# Patient Record
Sex: FEMALE | Race: WHITE | NOT HISPANIC OR LATINO | Employment: FULL TIME | URBAN - METROPOLITAN AREA
[De-identification: names, ages, dates, MRNs, and addresses within clinical notes are randomized per-mention and may not be internally consistent; named-entity substitution may affect disease eponyms.]

---

## 2023-03-15 DIAGNOSIS — Z00.00 HEALTHCARE MAINTENANCE: ICD-10-CM

## 2023-03-15 DIAGNOSIS — F32.A DEPRESSIVE DISORDER: ICD-10-CM

## 2023-03-15 RX ORDER — FLUOXETINE HYDROCHLORIDE 20 MG/1
20 CAPSULE ORAL DAILY
COMMUNITY
End: 2023-03-15 | Stop reason: SDUPTHER

## 2023-03-15 RX ORDER — CETIRIZINE HYDROCHLORIDE 10 MG/1
10 TABLET ORAL DAILY
COMMUNITY
End: 2023-03-15 | Stop reason: SDUPTHER

## 2023-03-15 RX ORDER — FLUOXETINE HYDROCHLORIDE 20 MG/1
20 CAPSULE ORAL DAILY
Qty: 90 CAPSULE | Refills: 3 | Status: SHIPPED | OUTPATIENT
Start: 2023-03-15 | End: 2023-04-13

## 2023-03-15 RX ORDER — CETIRIZINE HYDROCHLORIDE 10 MG/1
10 TABLET ORAL DAILY
Qty: 90 TABLET | Refills: 3 | Status: SHIPPED | OUTPATIENT
Start: 2023-03-15 | End: 2023-12-20

## 2023-03-15 RX ORDER — BUPROPION HYDROCHLORIDE 300 MG/1
300 TABLET ORAL DAILY
Qty: 90 TABLET | Refills: 3 | Status: SHIPPED | OUTPATIENT
Start: 2023-03-15 | End: 2023-04-13

## 2023-03-15 RX ORDER — BUPROPION HYDROCHLORIDE 300 MG/1
300 TABLET ORAL DAILY
COMMUNITY
End: 2023-03-15 | Stop reason: SDUPTHER

## 2023-04-11 DIAGNOSIS — F32.A DEPRESSIVE DISORDER: ICD-10-CM

## 2023-04-13 RX ORDER — BUPROPION HYDROCHLORIDE 300 MG/1
TABLET ORAL
Qty: 90 TABLET | Refills: 1 | Status: SHIPPED | OUTPATIENT
Start: 2023-04-13 | End: 2023-11-02 | Stop reason: SINTOL

## 2023-04-13 RX ORDER — FLUOXETINE HYDROCHLORIDE 20 MG/1
CAPSULE ORAL
Qty: 90 CAPSULE | Refills: 1 | Status: SHIPPED | OUTPATIENT
Start: 2023-04-13 | End: 2024-04-03

## 2023-09-11 ENCOUNTER — OFFICE VISIT (OUTPATIENT)
Dept: PRIMARY CARE | Facility: CLINIC | Age: 48
End: 2023-09-11
Payer: OTHER GOVERNMENT

## 2023-09-11 VITALS
OXYGEN SATURATION: 96 % | HEART RATE: 92 BPM | SYSTOLIC BLOOD PRESSURE: 132 MMHG | TEMPERATURE: 97.6 F | WEIGHT: 192 LBS | BODY MASS INDEX: 32.45 KG/M2 | RESPIRATION RATE: 18 BRPM | DIASTOLIC BLOOD PRESSURE: 84 MMHG

## 2023-09-11 DIAGNOSIS — B02.9 HERPES ZOSTER WITHOUT COMPLICATION: Primary | ICD-10-CM

## 2023-09-11 PROCEDURE — 99213 OFFICE O/P EST LOW 20 MIN: CPT | Performed by: NURSE PRACTITIONER

## 2023-09-11 PROCEDURE — 1036F TOBACCO NON-USER: CPT | Performed by: NURSE PRACTITIONER

## 2023-09-11 RX ORDER — VALACYCLOVIR HYDROCHLORIDE 1 G/1
1000 TABLET, FILM COATED ORAL 3 TIMES DAILY
Qty: 21 TABLET | Refills: 0 | Status: SHIPPED | OUTPATIENT
Start: 2023-09-11 | End: 2023-09-18

## 2023-09-11 ASSESSMENT — ENCOUNTER SYMPTOMS
COUGH: 0
CONSTITUTIONAL NEGATIVE: 1
SHORTNESS OF BREATH: 0
CARDIOVASCULAR NEGATIVE: 1
GASTROINTESTINAL NEGATIVE: 1
WHEEZING: 0

## 2023-09-11 NOTE — PROGRESS NOTES
Subjective   Patient ID: Selma Delacruz is a 48 y.o. female who presents for Rash.    Patient says that last week she was feeling some itchiness on her left buttock. She says that there was no rash at the time. Patient says that she had some tingling in the area this past Friday. She then noticed a rash on buttocks and going down the left thigh. It is itchy and tingling now. Patient notes that she has some lymph nodes that are swollen in her left groin.     Review of Systems   Constitutional: Negative.    HENT: Negative.     Respiratory:  Negative for cough, shortness of breath and wheezing.    Cardiovascular: Negative.    Gastrointestinal: Negative.    Skin:  Positive for rash.       Objective   /84   Pulse 92   Temp 36.4 °C (97.6 °F) (Temporal)   Resp 18   Wt 87.1 kg (192 lb)   SpO2 96%   BMI 32.45 kg/m²     Physical Exam  Vitals reviewed.   Constitutional:       General: She is not in acute distress.     Appearance: Normal appearance. She is not ill-appearing or diaphoretic.   Cardiovascular:      Rate and Rhythm: Normal rate and regular rhythm.      Heart sounds: Normal heart sounds. No murmur heard.  Pulmonary:      Effort: Pulmonary effort is normal.      Breath sounds: Normal breath sounds. No wheezing or rhonchi.   Musculoskeletal:         General: Normal range of motion.   Skin:     Findings: Rash (red blister like lesions on an erythematous base on the inner cheek of the buttocks, the lateral aspect of the left thigh and the inner left thig consistent with shingles) present.      Comments: Shotty lymphadenopathy of the groin lymph nodes on the left    Neurological:      General: No focal deficit present.      Mental Status: She is alert.   Psychiatric:         Mood and Affect: Mood normal.         Behavior: Behavior normal.     Assessment/Plan   Problem List Items Addressed This Visit    None  Visit Diagnoses       Herpes zoster without complication    -  Primary    Relevant Medications     valACYclovir (Valtrex) 1 gram tablet        Patient started on valtrex at this time. Advised pt that she may take tylenol or motrin every four to six hours as needed for any discomfort. Advised that patient is contagious and should avoid contact with anyone who is immunocompromised; such as expecting mothers, infants and the elderly. Advised that she is to keep the area clean and dry. Advised that it can take up to two weeks for the lesions to crust over and heal. Advised follow up if pain persists or with any new/concerning symptoms; she agreed. ER for any worsening rash or new/concerning symptoms; she agreed.

## 2023-11-01 PROBLEM — H52.223 REGULAR ASTIGMATISM, BILATERAL: Status: ACTIVE | Noted: 2017-09-19

## 2023-11-01 PROBLEM — M25.569 KNEE PAIN: Status: ACTIVE | Noted: 2023-11-01

## 2023-11-01 PROBLEM — F41.9 ANXIETY: Status: ACTIVE | Noted: 2023-11-01

## 2023-11-01 PROBLEM — Z86.19 HISTORY OF COLD SORES: Status: ACTIVE | Noted: 2023-11-01

## 2023-11-01 PROBLEM — H52.13 MYOPIA, BILATERAL: Status: ACTIVE | Noted: 2017-09-19

## 2023-11-01 PROBLEM — M70.51 PATELLAR BURSITIS OF RIGHT KNEE: Status: ACTIVE | Noted: 2023-11-01

## 2023-11-01 PROBLEM — H25.13 AGE-RELATED NUCLEAR CATARACT, BILATERAL: Status: ACTIVE | Noted: 2017-09-19

## 2023-11-01 PROBLEM — B00.9 HERPESVIRAL INFECTION, UNSPECIFIED: Status: ACTIVE | Noted: 2018-06-15

## 2023-11-01 PROBLEM — F43.10 POSTTRAUMATIC STRESS DISORDER: Status: ACTIVE | Noted: 2023-11-01

## 2023-11-01 RX ORDER — MULTIVITAMIN
1 TABLET ORAL DAILY
COMMUNITY
Start: 2022-05-05

## 2023-11-01 RX ORDER — ALPRAZOLAM 0.5 MG/1
0.5 TABLET ORAL EVERY 8 HOURS PRN
COMMUNITY

## 2023-11-01 RX ORDER — VALACYCLOVIR HYDROCHLORIDE 1 G/1
TABLET, FILM COATED ORAL
COMMUNITY
Start: 2022-05-06

## 2023-11-02 ENCOUNTER — LAB (OUTPATIENT)
Dept: LAB | Facility: LAB | Age: 48
End: 2023-11-02
Payer: OTHER GOVERNMENT

## 2023-11-02 ENCOUNTER — OFFICE VISIT (OUTPATIENT)
Dept: PRIMARY CARE | Facility: CLINIC | Age: 48
End: 2023-11-02
Payer: OTHER GOVERNMENT

## 2023-11-02 VITALS
RESPIRATION RATE: 16 BRPM | WEIGHT: 191 LBS | HEIGHT: 65 IN | HEART RATE: 72 BPM | TEMPERATURE: 98 F | DIASTOLIC BLOOD PRESSURE: 78 MMHG | SYSTOLIC BLOOD PRESSURE: 120 MMHG | OXYGEN SATURATION: 100 % | BODY MASS INDEX: 31.82 KG/M2

## 2023-11-02 DIAGNOSIS — Z12.31 ENCOUNTER FOR SCREENING MAMMOGRAM FOR MALIGNANT NEOPLASM OF BREAST: ICD-10-CM

## 2023-11-02 DIAGNOSIS — Z00.00 HEALTH CARE MAINTENANCE: ICD-10-CM

## 2023-11-02 DIAGNOSIS — Z98.82 HX OF BREAST IMPLANTS, BILATERAL: Primary | ICD-10-CM

## 2023-11-02 PROBLEM — M25.569 KNEE PAIN: Status: RESOLVED | Noted: 2023-11-01 | Resolved: 2023-11-02

## 2023-11-02 PROBLEM — M70.51 PATELLAR BURSITIS OF RIGHT KNEE: Status: RESOLVED | Noted: 2023-11-01 | Resolved: 2023-11-02

## 2023-11-02 PROBLEM — B00.9 HERPESVIRAL INFECTION, UNSPECIFIED: Status: RESOLVED | Noted: 2018-06-15 | Resolved: 2023-11-02

## 2023-11-02 LAB
25(OH)D3 SERPL-MCNC: 55 NG/ML (ref 30–100)
ALBUMIN SERPL BCP-MCNC: 4.3 G/DL (ref 3.4–5)
ALP SERPL-CCNC: 61 U/L (ref 33–110)
ALT SERPL W P-5'-P-CCNC: 19 U/L (ref 7–45)
ANION GAP SERPL CALC-SCNC: 12 MMOL/L (ref 10–20)
AST SERPL W P-5'-P-CCNC: 19 U/L (ref 9–39)
BILIRUB SERPL-MCNC: 0.5 MG/DL (ref 0–1.2)
BUN SERPL-MCNC: 15 MG/DL (ref 6–23)
CALCIUM SERPL-MCNC: 9.5 MG/DL (ref 8.6–10.3)
CHLORIDE SERPL-SCNC: 102 MMOL/L (ref 98–107)
CHOLEST SERPL-MCNC: 255 MG/DL (ref 0–199)
CHOLESTEROL/HDL RATIO: 5.3
CO2 SERPL-SCNC: 28 MMOL/L (ref 21–32)
CREAT SERPL-MCNC: 0.74 MG/DL (ref 0.5–1.05)
ERYTHROCYTE [DISTWIDTH] IN BLOOD BY AUTOMATED COUNT: 12.3 % (ref 11.5–14.5)
GFR SERPL CREATININE-BSD FRML MDRD: >90 ML/MIN/1.73M*2
GLUCOSE SERPL-MCNC: 84 MG/DL (ref 74–99)
HCT VFR BLD AUTO: 38 % (ref 36–46)
HDLC SERPL-MCNC: 48.4 MG/DL
HGB BLD-MCNC: 12.5 G/DL (ref 12–16)
LDLC SERPL CALC-MCNC: 173 MG/DL
MCH RBC QN AUTO: 31.5 PG (ref 26–34)
MCHC RBC AUTO-ENTMCNC: 32.9 G/DL (ref 32–36)
MCV RBC AUTO: 96 FL (ref 80–100)
NON HDL CHOLESTEROL: 207 MG/DL (ref 0–149)
NRBC BLD-RTO: 0 /100 WBCS (ref 0–0)
PLATELET # BLD AUTO: 282 X10*3/UL (ref 150–450)
POTASSIUM SERPL-SCNC: 4.6 MMOL/L (ref 3.5–5.3)
PROT SERPL-MCNC: 6.9 G/DL (ref 6.4–8.2)
RBC # BLD AUTO: 3.97 X10*6/UL (ref 4–5.2)
SODIUM SERPL-SCNC: 137 MMOL/L (ref 136–145)
TRIGL SERPL-MCNC: 168 MG/DL (ref 0–149)
TSH SERPL-ACNC: 1.57 MIU/L (ref 0.44–3.98)
VIT B12 SERPL-MCNC: 451 PG/ML (ref 211–911)
VLDL: 34 MG/DL (ref 0–40)
WBC # BLD AUTO: 4.7 X10*3/UL (ref 4.4–11.3)

## 2023-11-02 PROCEDURE — 80061 LIPID PANEL: CPT

## 2023-11-02 PROCEDURE — 80053 COMPREHEN METABOLIC PANEL: CPT

## 2023-11-02 PROCEDURE — 99396 PREV VISIT EST AGE 40-64: CPT | Performed by: INTERNAL MEDICINE

## 2023-11-02 PROCEDURE — 82306 VITAMIN D 25 HYDROXY: CPT

## 2023-11-02 PROCEDURE — 82607 VITAMIN B-12: CPT

## 2023-11-02 PROCEDURE — 84443 ASSAY THYROID STIM HORMONE: CPT

## 2023-11-02 PROCEDURE — 36415 COLL VENOUS BLD VENIPUNCTURE: CPT

## 2023-11-02 PROCEDURE — 1036F TOBACCO NON-USER: CPT | Performed by: INTERNAL MEDICINE

## 2023-11-02 PROCEDURE — 85027 COMPLETE CBC AUTOMATED: CPT

## 2023-11-02 RX ORDER — BUPROPION HYDROCHLORIDE 150 MG/1
150 TABLET ORAL EVERY MORNING
Qty: 30 TABLET | Refills: 1 | Status: SHIPPED | OUTPATIENT
Start: 2023-11-02 | End: 2023-12-20 | Stop reason: ALTCHOICE

## 2023-11-02 ASSESSMENT — PROMIS GLOBAL HEALTH SCALE
EMOTIONAL_PROBLEMS: SOMETIMES
RATE_AVERAGE_PAIN: 1
RATE_MENTAL_HEALTH: VERY GOOD
RATE_PHYSICAL_HEALTH: VERY GOOD
RATE_SOCIAL_SATISFACTION: EXCELLENT
RATE_QUALITY_OF_LIFE: EXCELLENT
CARRYOUT_SOCIAL_ACTIVITIES: EXCELLENT
RATE_GENERAL_HEALTH: VERY GOOD
RATE_AVERAGE_FATIGUE: MILD
CARRYOUT_PHYSICAL_ACTIVITIES: COMPLETELY

## 2023-11-02 ASSESSMENT — ENCOUNTER SYMPTOMS
PSYCHIATRIC NEGATIVE: 1
WHEEZING: 0
SORE THROAT: 0
NAUSEA: 0
FEVER: 0
EYE REDNESS: 0
HEADACHES: 0
COUGH: 0
FATIGUE: 0
BACK PAIN: 0
UNEXPECTED WEIGHT CHANGE: 0
FREQUENCY: 0
CONSTIPATION: 0
PALPITATIONS: 0
DIARRHEA: 0
ARTHRALGIAS: 0
WEAKNESS: 0
DYSURIA: 0
SHORTNESS OF BREATH: 0
EYE ITCHING: 0
RHINORRHEA: 0
EYE PAIN: 0
DIFFICULTY URINATING: 0
ABDOMINAL PAIN: 0

## 2023-11-02 ASSESSMENT — PATIENT HEALTH QUESTIONNAIRE - PHQ9
2. FEELING DOWN, DEPRESSED OR HOPELESS: NOT AT ALL
1. LITTLE INTEREST OR PLEASURE IN DOING THINGS: NOT AT ALL
SUM OF ALL RESPONSES TO PHQ9 QUESTIONS 1 AND 2: 0

## 2023-11-02 ASSESSMENT — PAIN SCALES - GENERAL: PAINLEVEL: 0-NO PAIN

## 2023-11-02 NOTE — PROGRESS NOTES
"Subjective   Selma Delacruz is a 48 y.o. female who presents for Annual Exam.    HPI   Influenza declines  Covid 2021  Shingrix  Tdap 2019  Mammogram 2022 implants long ago  Pap Hysterectomy  Colonoscopy approx 2020, 2019 pt obtaining report at [facility]  Bmi 31  Depression screen 23  Eye exam 23    Wants to stop Wellbutrin.    Review of Systems   Constitutional:  Negative for fatigue, fever and unexpected weight change.   HENT:  Negative for congestion, ear pain, rhinorrhea and sore throat.    Eyes:  Negative for pain, redness and itching.   Respiratory:  Negative for cough, shortness of breath and wheezing.    Cardiovascular:  Negative for chest pain, palpitations and leg swelling.   Gastrointestinal:  Negative for abdominal pain, constipation, diarrhea and nausea.   Genitourinary:  Negative for difficulty urinating, dysuria and frequency.   Musculoskeletal:  Negative for arthralgias and back pain.   Allergic/Immunologic: Negative for environmental allergies, food allergies and immunocompromised state.   Neurological:  Negative for weakness and headaches.   Psychiatric/Behavioral: Negative.     All other systems reviewed and are negative.      Health Maintenance Due   Topic Date Due    Yearly Adult Physical  Never done    Hepatitis B Vaccines (1 of 3 - 3-dose series) Never done    HIV Screening  Never done    Colorectal Cancer Screening  Never done    MMR Vaccines (1 of 1 - Standard series) Never done    Hepatitis C Screening  Never done    DTaP/Tdap/Td Vaccines (1 - Tdap) 05/04/2019    COVID-19 Vaccine (3 - Pfizer series) 06/30/2021    Zoster Vaccines (1 of 2) Never done    Mammogram  05/31/2023    Influenza Vaccine (1) Never done       Objective   /78   Pulse 72   Temp 36.7 °C (98 °F)   Resp 16   Ht 1.651 m (5' 5\")   Wt 86.6 kg (191 lb)   SpO2 100%   BMI 31.78 kg/m²     Physical Exam  Vitals and nursing note reviewed.   Constitutional:       Appearance: Normal appearance.   HENT:      Head: " Normocephalic.   Eyes:      Conjunctiva/sclera: Conjunctivae normal.      Pupils: Pupils are equal, round, and reactive to light.   Cardiovascular:      Rate and Rhythm: Normal rate and regular rhythm.      Pulses: Normal pulses.      Heart sounds: Normal heart sounds.   Pulmonary:      Effort: Pulmonary effort is normal.      Breath sounds: Normal breath sounds.   Musculoskeletal:         General: No swelling.      Cervical back: Neck supple.   Skin:     General: Skin is warm and dry.   Neurological:      General: No focal deficit present.      Mental Status: She is oriented to person, place, and time.         Assessment/Plan   Problem List Items Addressed This Visit    None  Visit Diagnoses       Hx of breast implants, bilateral    -  Primary    Relevant Medications    buPROPion XL (Wellbutrin XL) 150 mg 24 hr tablet    Other Relevant Orders    BI mammo bilateral diagnostic tomosynthesis    BI US breast complete bilateral    Referral to Plastic Surgery    Health care maintenance        Relevant Orders    CBC    Comprehensive Metabolic Panel    Lipid Panel    Thyroid Stimulating Hormone    Vitamin B12    Vitamin D 25-Hydroxy,Total (for eval of Vitamin D levels)    Encounter for screening mammogram for malignant neoplasm of breast

## 2023-11-03 ENCOUNTER — TELEPHONE (OUTPATIENT)
Dept: PRIMARY CARE | Facility: CLINIC | Age: 48
End: 2023-11-03
Payer: OTHER GOVERNMENT

## 2023-11-03 NOTE — TELEPHONE ENCOUNTER
----- Message from Lisa Hedrick DO sent at 11/3/2023  8:55 AM EDT -----  Call patient her labs look very good  Her chol is high, I would recommend lifesytle changes  Her risk is low recommend yearly recheck

## 2023-11-03 NOTE — RESULT ENCOUNTER NOTE
Call patient her labs look very good  Her chol is high, I would recommend lifesytle changes  Her risk is low recommend yearly recheck

## 2023-11-20 ENCOUNTER — HOSPITAL ENCOUNTER (OUTPATIENT)
Dept: RADIOLOGY | Facility: HOSPITAL | Age: 48
Discharge: HOME | End: 2023-11-20
Payer: OTHER GOVERNMENT

## 2023-11-20 ENCOUNTER — TELEPHONE (OUTPATIENT)
Dept: PRIMARY CARE | Facility: CLINIC | Age: 48
End: 2023-11-20
Payer: OTHER GOVERNMENT

## 2023-11-20 ENCOUNTER — APPOINTMENT (OUTPATIENT)
Dept: RADIOLOGY | Facility: HOSPITAL | Age: 48
End: 2023-11-20
Payer: OTHER GOVERNMENT

## 2023-11-20 DIAGNOSIS — Z98.82 HX OF BREAST IMPLANTS, BILATERAL: ICD-10-CM

## 2023-11-20 PROCEDURE — 77066 DX MAMMO INCL CAD BI: CPT | Mod: LEFT SIDE | Performed by: RADIOLOGY

## 2023-11-20 PROCEDURE — 77062 BREAST TOMOSYNTHESIS BI: CPT | Mod: LEFT SIDE | Performed by: RADIOLOGY

## 2023-11-20 PROCEDURE — 76642 ULTRASOUND BREAST LIMITED: CPT | Mod: LT

## 2023-11-20 PROCEDURE — 77066 DX MAMMO INCL CAD BI: CPT

## 2023-11-20 PROCEDURE — 76642 ULTRASOUND BREAST LIMITED: CPT | Mod: LEFT SIDE | Performed by: RADIOLOGY

## 2023-11-20 NOTE — TELEPHONE ENCOUNTER
----- Message from Lisa Hedrick DO sent at 11/20/2023 12:49 PM EST -----  Call patient mammogram is normal.

## 2023-12-13 ENCOUNTER — APPOINTMENT (OUTPATIENT)
Dept: PRIMARY CARE | Facility: CLINIC | Age: 48
End: 2023-12-13
Payer: OTHER GOVERNMENT

## 2023-12-20 ENCOUNTER — OFFICE VISIT (OUTPATIENT)
Dept: PRIMARY CARE | Facility: CLINIC | Age: 48
End: 2023-12-20
Payer: OTHER GOVERNMENT

## 2023-12-20 VITALS
TEMPERATURE: 98 F | DIASTOLIC BLOOD PRESSURE: 72 MMHG | HEIGHT: 65 IN | SYSTOLIC BLOOD PRESSURE: 110 MMHG | WEIGHT: 200 LBS | RESPIRATION RATE: 16 BRPM | BODY MASS INDEX: 33.32 KG/M2 | OXYGEN SATURATION: 98 % | HEART RATE: 76 BPM

## 2023-12-20 DIAGNOSIS — Z00.00 HEALTHCARE MAINTENANCE: ICD-10-CM

## 2023-12-20 DIAGNOSIS — E78.2 MIXED HYPERLIPIDEMIA: ICD-10-CM

## 2023-12-20 DIAGNOSIS — E66.9 OBESITY (BMI 30.0-34.9): Primary | ICD-10-CM

## 2023-12-20 PROCEDURE — 1036F TOBACCO NON-USER: CPT | Performed by: INTERNAL MEDICINE

## 2023-12-20 PROCEDURE — 99213 OFFICE O/P EST LOW 20 MIN: CPT | Performed by: INTERNAL MEDICINE

## 2023-12-20 RX ORDER — CETIRIZINE HYDROCHLORIDE 10 MG/1
10 TABLET ORAL DAILY
Qty: 90 TABLET | Refills: 3 | Status: SHIPPED | OUTPATIENT
Start: 2023-12-20 | End: 2024-04-26 | Stop reason: SDUPTHER

## 2023-12-20 RX ORDER — PHENTERMINE HYDROCHLORIDE 37.5 MG/1
37.5 TABLET ORAL
Qty: 30 TABLET | Refills: 0 | Status: SHIPPED | OUTPATIENT
Start: 2023-12-20 | End: 2024-01-22 | Stop reason: SDUPTHER

## 2023-12-20 ASSESSMENT — ENCOUNTER SYMPTOMS
FEVER: 0
SHORTNESS OF BREATH: 0
FATIGUE: 0
COUGH: 0

## 2023-12-20 ASSESSMENT — PAIN SCALES - GENERAL: PAINLEVEL: 0-NO PAIN

## 2023-12-20 NOTE — PROGRESS NOTES
OARRS:  Lisa Hedrick, DO on 12/20/2023  8:48 AM  I have personally reviewed the OARRS report for Selma Delacruz. I have considered the risks of abuse, dependence, addiction and diversion    Is the patient prescribed a combination of a benzodiazepine and opioid?  Yes, I feel it is clincially indicated to continue the medication and have discussed with the patient risks/benefits/alternatives.    Last Urine Drug Screen / ordered today: No  No results found for this or any previous visit (from the past 8760 hour(s)).  N/A    Clinical rationale for not completing a Urine Drug Screen: Patient prescribed only an antidiarrheal, anorexiant, or testosterone      Controlled Substance Agreement:  Date of the Last Agreement: 12/20/23  Reviewed Controlled Substance Agreement including but not limited to the benefits, risks, and alternatives to treatment with a Controlled Substance medication(s).    Anorexiants:   What is the patient's goal of therapy? Weight loss  Is this being achieved with current treatment? no    I have assessed the patient's continuing efforts to lose weight.    Patient has demonstrated continued efforts to lose weight, is dedicated to the treatment program and the response to treatment.    Activities of Daily Living:   Is your overall impression that this patient is benefiting (symptom reduction outweighs side effects) from anorexiants therapy? Yes     1. Physical Functioning: Better  2. Family Relationship: Better  3. Social Relationship: Better  4. Mood: Better  5. Sleep Patterns: Better  6. Overall Function: Better  Subjective   Selma Delacruz is a 48 y.o. female who presents for Weight Concerns.    HPI   Pt in office to discuss starting Adipex.  Having difficulty losing weight.  Has worked on diet and exercise.    Doing pilates      Review of Systems   Constitutional:  Negative for fatigue and fever.   Respiratory:  Negative for cough and shortness of breath.    Cardiovascular:  Negative for chest  "pain and leg swelling.   All other systems reviewed and are negative.      Health Maintenance Due   Topic Date Due    Hepatitis B Vaccines (1 of 3 - 3-dose series) Never done    HIV Screening  Never done    Colorectal Cancer Screening  Never done    COVID-19 Vaccine (1) Never done    MMR Vaccines (1 of 1 - Standard series) Never done    Hepatitis C Screening  Never done    DTaP/Tdap/Td Vaccines (1 - Tdap) 05/04/2019    Zoster Vaccines (1 of 2) Never done    Influenza Vaccine (1) Never done       Objective   /72   Pulse 76   Temp 36.7 °C (98 °F)   Resp 16   Ht 1.651 m (5' 5\")   Wt 90.7 kg (200 lb)   SpO2 98%   BMI 33.28 kg/m²     Physical Exam  Vitals and nursing note reviewed.   Constitutional:       Appearance: Normal appearance.   HENT:      Head: Normocephalic.   Eyes:      Conjunctiva/sclera: Conjunctivae normal.      Pupils: Pupils are equal, round, and reactive to light.   Cardiovascular:      Rate and Rhythm: Normal rate and regular rhythm.      Pulses: Normal pulses.      Heart sounds: Normal heart sounds.   Pulmonary:      Effort: Pulmonary effort is normal.      Breath sounds: Normal breath sounds.   Musculoskeletal:         General: No swelling.      Cervical back: Neck supple.   Skin:     General: Skin is warm and dry.   Neurological:      General: No focal deficit present.      Mental Status: She is oriented to person, place, and time.         Assessment/Plan   Problem List Items Addressed This Visit    None  Visit Diagnoses       Obesity (BMI 30.0-34.9)    -  Primary    Relevant Medications    phentermine (Adipex-P) 37.5 mg tablet    Mixed hyperlipidemia                Discussed risks and benefits of meds  Check glp1   I have personally reviewed patients OARRS report.  This report is scanned into the emr.  I have considered the risks of abuse, dependence, addiction and diversion.       "

## 2023-12-26 ENCOUNTER — APPOINTMENT (OUTPATIENT)
Dept: PRIMARY CARE | Facility: CLINIC | Age: 48
End: 2023-12-26
Payer: OTHER GOVERNMENT

## 2024-01-22 ENCOUNTER — OFFICE VISIT (OUTPATIENT)
Dept: PRIMARY CARE | Facility: CLINIC | Age: 49
End: 2024-01-22
Payer: OTHER GOVERNMENT

## 2024-01-22 VITALS
WEIGHT: 193 LBS | SYSTOLIC BLOOD PRESSURE: 118 MMHG | HEIGHT: 65 IN | BODY MASS INDEX: 32.15 KG/M2 | DIASTOLIC BLOOD PRESSURE: 80 MMHG | TEMPERATURE: 97.9 F | OXYGEN SATURATION: 100 % | HEART RATE: 88 BPM | RESPIRATION RATE: 16 BRPM

## 2024-01-22 DIAGNOSIS — E66.9 OBESITY (BMI 30.0-34.9): ICD-10-CM

## 2024-01-22 DIAGNOSIS — Z12.11 SCREENING FOR COLON CANCER: Primary | ICD-10-CM

## 2024-01-22 PROCEDURE — 1036F TOBACCO NON-USER: CPT | Performed by: INTERNAL MEDICINE

## 2024-01-22 PROCEDURE — 99213 OFFICE O/P EST LOW 20 MIN: CPT | Performed by: INTERNAL MEDICINE

## 2024-01-22 RX ORDER — PHENTERMINE HYDROCHLORIDE 37.5 MG/1
37.5 TABLET ORAL
Qty: 30 TABLET | Refills: 0 | Status: SHIPPED | OUTPATIENT
Start: 2024-01-22 | End: 2024-02-21 | Stop reason: SDUPTHER

## 2024-01-22 ASSESSMENT — PAIN SCALES - GENERAL: PAINLEVEL: 0-NO PAIN

## 2024-01-22 ASSESSMENT — ENCOUNTER SYMPTOMS
COUGH: 0
FEVER: 0
SHORTNESS OF BREATH: 0
FATIGUE: 0

## 2024-01-22 NOTE — PROGRESS NOTES
"Subjective   Selma Delacruz is a 48 y.o. female who presents for Weight Check.    HPI   Started Adipex.  Denies adverse s/e's.  Tolerating well.  Eating less.  1 meal/day.    Review of Systems   Constitutional:  Negative for fatigue and fever.   Respiratory:  Negative for cough and shortness of breath.    Cardiovascular:  Negative for chest pain and leg swelling.   All other systems reviewed and are negative.      Health Maintenance Due   Topic Date Due    Hepatitis B Vaccines (1 of 3 - 3-dose series) Never done    HIV Screening  Never done    MMR Vaccines (1 of 1 - Standard series) Never done    Hepatitis C Screening  Never done    Diabetes Screening  Never done    DTaP/Tdap/Td Vaccines (1 - Tdap) 05/04/2019    COVID-19 Vaccine (3 - Pfizer series) 06/30/2021    Zoster Vaccines (1 of 2) Never done    Influenza Vaccine (1) Never done       Objective   /80   Pulse 88   Temp 36.6 °C (97.9 °F)   Resp 16   Ht 1.651 m (5' 5\")   Wt 87.5 kg (193 lb)   SpO2 100%   BMI 32.12 kg/m²     Physical Exam  Vitals and nursing note reviewed.   Constitutional:       Appearance: Normal appearance.   HENT:      Head: Normocephalic.   Eyes:      Conjunctiva/sclera: Conjunctivae normal.      Pupils: Pupils are equal, round, and reactive to light.   Cardiovascular:      Rate and Rhythm: Normal rate and regular rhythm.      Pulses: Normal pulses.      Heart sounds: Normal heart sounds.   Pulmonary:      Effort: Pulmonary effort is normal.      Breath sounds: Normal breath sounds.   Musculoskeletal:         General: No swelling.      Cervical back: Neck supple.   Skin:     General: Skin is warm and dry.   Neurological:      General: No focal deficit present.      Mental Status: She is oriented to person, place, and time.         Assessment/Plan   Problem List Items Addressed This Visit    None  Visit Diagnoses       Screening for colon cancer    -  Primary    Relevant Orders    Colonoscopy Screening; Average Risk Patient    " Obesity (BMI 30.0-34.9)        Relevant Medications    phentermine (Adipex-P) 37.5 mg tablet          I have personally reviewed patients OARRS report.  This report is scanned into the emr.  I have considered the risks of abuse, dependence, addiction and diversion.  Cont adipex  Pt doing well but safer option going forward longterm is wegovy and zepbound   Will check with in[employer]urance

## 2024-01-24 DIAGNOSIS — F41.9 ANXIETY: Primary | ICD-10-CM

## 2024-01-24 RX ORDER — BUPROPION HYDROCHLORIDE 300 MG/1
300 TABLET ORAL EVERY MORNING
Qty: 30 TABLET | Refills: 3 | Status: SHIPPED | OUTPATIENT
Start: 2024-01-24 | End: 2024-02-01

## 2024-01-24 RX ORDER — BUPROPION HYDROCHLORIDE 150 MG/1
150 TABLET ORAL EVERY MORNING
Qty: 30 TABLET | Refills: 0 | Status: SHIPPED | OUTPATIENT
Start: 2024-01-24 | End: 2024-02-21 | Stop reason: ALTCHOICE

## 2024-02-01 DIAGNOSIS — F41.9 ANXIETY: ICD-10-CM

## 2024-02-01 RX ORDER — BUPROPION HYDROCHLORIDE 300 MG/1
300 TABLET ORAL DAILY
Qty: 90 TABLET | Refills: 3 | Status: SHIPPED | OUTPATIENT
Start: 2024-02-01 | End: 2024-04-26 | Stop reason: SDUPTHER

## 2024-02-03 ENCOUNTER — ANESTHESIA EVENT (OUTPATIENT)
Dept: GASTROENTEROLOGY | Facility: EXTERNAL LOCATION | Age: 49
End: 2024-02-03

## 2024-02-06 ENCOUNTER — HOSPITAL ENCOUNTER (OUTPATIENT)
Dept: RADIOLOGY | Facility: CLINIC | Age: 49
Discharge: HOME | End: 2024-02-06
Payer: OTHER GOVERNMENT

## 2024-02-06 DIAGNOSIS — M25.562 PAIN IN LEFT KNEE: ICD-10-CM

## 2024-02-06 PROCEDURE — 73564 X-RAY EXAM KNEE 4 OR MORE: CPT | Mod: LEFT SIDE | Performed by: RADIOLOGY

## 2024-02-06 PROCEDURE — 73564 X-RAY EXAM KNEE 4 OR MORE: CPT | Mod: LT

## 2024-02-09 ENCOUNTER — OFFICE VISIT (OUTPATIENT)
Dept: ORTHOPEDIC SURGERY | Facility: CLINIC | Age: 49
End: 2024-02-09
Payer: OTHER GOVERNMENT

## 2024-02-09 VITALS — HEIGHT: 65 IN | WEIGHT: 195 LBS | BODY MASS INDEX: 32.49 KG/M2

## 2024-02-09 DIAGNOSIS — S83.242A TEAR OF MEDIAL MENISCUS OF LEFT KNEE, CURRENT, UNSPECIFIED TEAR TYPE, INITIAL ENCOUNTER: Primary | ICD-10-CM

## 2024-02-09 PROCEDURE — 2500000004 HC RX 250 GENERAL PHARMACY W/ HCPCS (ALT 636 FOR OP/ED): Performed by: STUDENT IN AN ORGANIZED HEALTH CARE EDUCATION/TRAINING PROGRAM

## 2024-02-09 PROCEDURE — 2500000005 HC RX 250 GENERAL PHARMACY W/O HCPCS: Performed by: STUDENT IN AN ORGANIZED HEALTH CARE EDUCATION/TRAINING PROGRAM

## 2024-02-09 PROCEDURE — 20610 DRAIN/INJ JOINT/BURSA W/O US: CPT | Mod: LT | Performed by: STUDENT IN AN ORGANIZED HEALTH CARE EDUCATION/TRAINING PROGRAM

## 2024-02-09 PROCEDURE — 1036F TOBACCO NON-USER: CPT | Performed by: STUDENT IN AN ORGANIZED HEALTH CARE EDUCATION/TRAINING PROGRAM

## 2024-02-09 PROCEDURE — 99214 OFFICE O/P EST MOD 30 MIN: CPT | Performed by: STUDENT IN AN ORGANIZED HEALTH CARE EDUCATION/TRAINING PROGRAM

## 2024-02-09 PROCEDURE — 99204 OFFICE O/P NEW MOD 45 MIN: CPT | Performed by: STUDENT IN AN ORGANIZED HEALTH CARE EDUCATION/TRAINING PROGRAM

## 2024-02-09 RX ORDER — LIDOCAINE HYDROCHLORIDE 10 MG/ML
4 INJECTION INFILTRATION; PERINEURAL
Status: COMPLETED | OUTPATIENT
Start: 2024-02-09 | End: 2024-02-09

## 2024-02-09 RX ORDER — TRIAMCINOLONE ACETONIDE 40 MG/ML
40 INJECTION, SUSPENSION INTRA-ARTICULAR; INTRAMUSCULAR
Status: COMPLETED | OUTPATIENT
Start: 2024-02-09 | End: 2024-02-09

## 2024-02-09 RX ADMIN — TRIAMCINOLONE ACETONIDE 40 MG: 40 INJECTION, SUSPENSION INTRA-ARTICULAR; INTRAMUSCULAR at 09:29

## 2024-02-09 RX ADMIN — LIDOCAINE HYDROCHLORIDE 4 ML: 10 INJECTION, SOLUTION INFILTRATION; PERINEURAL at 09:29

## 2024-02-09 ASSESSMENT — PAIN SCALES - GENERAL: PAINLEVEL_OUTOF10: 4

## 2024-02-09 ASSESSMENT — PAIN - FUNCTIONAL ASSESSMENT: PAIN_FUNCTIONAL_ASSESSMENT: 0-10

## 2024-02-09 NOTE — PROGRESS NOTES
Chief Complaint   Patient presents with    Left Knee - New Patient Visit     Left knee pain   DOI- on going pain x 1 month   Xray's done alycia 24       HPI  48-year-old female with left knee pain that has been present for at least the last month.  Very active with Pilates and has been having some clicking catching popping as well as instability even when she was walking into her visit today to clinic.  States that this is quite bothersome did recently see local urgent care who did refer her to orthopedics.    Past Medical History:   Diagnosis Date    Anxiety 2000    Personal history of other endocrine, nutritional and metabolic disease     History of high cholesterol       Past Surgical History:   Procedure Laterality Date    BREAST SURGERY       SECTION, LOW TRANSVERSE      COSMETIC SURGERY      HYSTERECTOMY      OTHER SURGICAL HISTORY  2022    Bunionectomy    OTHER SURGICAL HISTORY  2022    Breast augmentation    TUBAL LIGATION          No Known Allergies     Physical exam    General: Alert and oriented to place, person, and time.  No acute distress and breathing comfortably; pleasant and cooperative with the examination.  HEENT: Head is normocephalic and atraumatic.  Neck: Supple, no visible swelling.  Cardiovascular: Good perfusion to the affected extremity.  Lungs: No audible wheezing or labored breathing.  Abdomen: Nondistended  HEME/Lymph : No visible abnormalities bilateral lower extremity    Extremity:  Left knee:  Skin healthy and intact  No gross swelling or ecchymosis  No significant varus or valgus malalignment  Effusion: Mild     ROM:  Full flexion   Full extension  No pain with internal rotation of the hip  Tenderness to palpation: Medial lateral joint line     No laxity to valgus stress  No laxity to varus stress  Negative Lachman´s test  Negative anterior drawer test  Negative posterior drawer test  Positive Liliam´s test     Neurovascular exam normal  distally    Diagnostics:  XR knee left 4+ views    Result Date: 2/6/2024  Interpreted By:  Chance Thomas, STUDY: XR KNEE LEFT 4+ VIEWS; ;  2/6/2024 9:22 am   INDICATION: Signs/Symptoms:Pain in left knee.   COMPARISON: None.   ACCESSION NUMBER(S): [id number]   ORDERING CLINICIAN: MIK PARRA   FINDINGS: Four views demonstrate normal alignment without joint space narrowing, fracture or suspicious osseous lesion. Findings are equivocal for effusion.       No acute osseous abnormality or significant degenerative joint space narrowing.   Findings are equivocal for effusion.     MACRO: None   Signed by: Chance Thomas 2/6/2024 10:49 AM Dictation workstation:   [id number]       Procedure:  L Inj/Asp: L knee on 2/9/2024 9:29 AM  Indications: pain  Details: 22 G needle, anterolateral approach  Medications: 40 mg triamcinolone acetonide 40 mg/mL; 4 mL lidocaine 10 mg/mL (1 %)  Consent was given by the patient. Immediately prior to procedure a time out was called to verify the correct patient, procedure, equipment, support staff and site/side marked as required. Patient was prepped and draped in the usual sterile fashion.           Assessment:  48-year-old female with concerns for internal derangement of the knee    Treatment plan:  The natural history of the condition and its associated treatment alternatives including surgical and nonsurgical options were discussed with the patient at length.  As patient does have upcoming trip to [place] he is hoping to get some temporary relief does wish to proceed with an intra-articular injection  The history and clinical exam are consistent with intraarticular pathology and therefore MRI is medically indicated to evaluate the soft tissues of the joint. Follow up in one week or after completion of the MRI to advance management accordingly  The patient understands and agrees with the plan.  I discussed risks and benefits of corticosteroid injection and the patient would like to  proceed.  Discussed risks of skin depigmentation and the rare but possible intravascular injection of local anesthetic which can cause palpitations or arrhythmias. I discussed the possibility of a transient increase in blood sugar. I explained that the local anesthetic tends to work immediately, it may take 2-3 days for the full effect of the corticosteroid compound. Consent was obtained and side confirmed by the patient.    Will have patient follow-up in 1 to 2 weeks for further discussion of treatment continue use of over-the-counter compression sleeve.  Discussed activities to avoid as well as prevention of injury.  All of the patient's questions were answered.

## 2024-02-14 ENCOUNTER — HOSPITAL ENCOUNTER (OUTPATIENT)
Dept: RADIOLOGY | Facility: HOSPITAL | Age: 49
Discharge: HOME | End: 2024-02-14
Payer: OTHER GOVERNMENT

## 2024-02-14 DIAGNOSIS — S83.242A TEAR OF MEDIAL MENISCUS OF LEFT KNEE, CURRENT, UNSPECIFIED TEAR TYPE, INITIAL ENCOUNTER: ICD-10-CM

## 2024-02-14 PROCEDURE — 73721 MRI JNT OF LWR EXTRE W/O DYE: CPT | Mod: LT

## 2024-02-14 PROCEDURE — 73721 MRI JNT OF LWR EXTRE W/O DYE: CPT | Mod: LEFT SIDE | Performed by: RADIOLOGY

## 2024-02-15 ENCOUNTER — HOSPITAL ENCOUNTER (OUTPATIENT)
Dept: GASTROENTEROLOGY | Facility: EXTERNAL LOCATION | Age: 49
Discharge: HOME | End: 2024-02-15
Payer: OTHER GOVERNMENT

## 2024-02-15 ENCOUNTER — ANESTHESIA (OUTPATIENT)
Dept: GASTROENTEROLOGY | Facility: EXTERNAL LOCATION | Age: 49
End: 2024-02-15

## 2024-02-15 VITALS
HEIGHT: 65 IN | HEART RATE: 64 BPM | SYSTOLIC BLOOD PRESSURE: 116 MMHG | WEIGHT: 180 LBS | BODY MASS INDEX: 29.99 KG/M2 | DIASTOLIC BLOOD PRESSURE: 67 MMHG | RESPIRATION RATE: 16 BRPM | OXYGEN SATURATION: 97 % | TEMPERATURE: 97.3 F

## 2024-02-15 DIAGNOSIS — Z12.11 SCREENING FOR COLON CANCER: Primary | ICD-10-CM

## 2024-02-15 PROCEDURE — 88305 TISSUE EXAM BY PATHOLOGIST: CPT | Performed by: PATHOLOGY

## 2024-02-15 PROCEDURE — 45381 COLONOSCOPY SUBMUCOUS NJX: CPT | Performed by: INTERNAL MEDICINE

## 2024-02-15 PROCEDURE — 45385 COLONOSCOPY W/LESION REMOVAL: CPT | Performed by: INTERNAL MEDICINE

## 2024-02-15 PROCEDURE — 88305 TISSUE EXAM BY PATHOLOGIST: CPT

## 2024-02-15 PROCEDURE — 0753T DGTZ GLS MCRSCP SLD LEVEL IV: CPT

## 2024-02-15 RX ORDER — SODIUM CHLORIDE 9 MG/ML
20 INJECTION, SOLUTION INTRAVENOUS CONTINUOUS
Status: DISCONTINUED | OUTPATIENT
Start: 2024-02-15 | End: 2024-02-16 | Stop reason: HOSPADM

## 2024-02-15 RX ORDER — PROPOFOL 10 MG/ML
INJECTION, EMULSION INTRAVENOUS AS NEEDED
Status: DISCONTINUED | OUTPATIENT
Start: 2024-02-15 | End: 2024-02-15

## 2024-02-15 RX ORDER — LIDOCAINE HYDROCHLORIDE 20 MG/ML
INJECTION, SOLUTION INFILTRATION; PERINEURAL AS NEEDED
Status: DISCONTINUED | OUTPATIENT
Start: 2024-02-15 | End: 2024-02-15

## 2024-02-15 RX ADMIN — PROPOFOL 150 MG: 10 INJECTION, EMULSION INTRAVENOUS at 08:06

## 2024-02-15 RX ADMIN — LIDOCAINE HYDROCHLORIDE 50 MG: 20 INJECTION, SOLUTION INFILTRATION; PERINEURAL at 08:06

## 2024-02-15 RX ADMIN — PROPOFOL 50 MG: 10 INJECTION, EMULSION INTRAVENOUS at 08:23

## 2024-02-15 RX ADMIN — PROPOFOL 50 MG: 10 INJECTION, EMULSION INTRAVENOUS at 08:19

## 2024-02-15 RX ADMIN — PROPOFOL 50 MG: 10 INJECTION, EMULSION INTRAVENOUS at 08:14

## 2024-02-15 RX ADMIN — SODIUM CHLORIDE: 9 INJECTION, SOLUTION INTRAVENOUS at 08:28

## 2024-02-15 SDOH — HEALTH STABILITY: MENTAL HEALTH: CURRENT SMOKER: 0

## 2024-02-15 ASSESSMENT — COLUMBIA-SUICIDE SEVERITY RATING SCALE - C-SSRS
6. HAVE YOU EVER DONE ANYTHING, STARTED TO DO ANYTHING, OR PREPARED TO DO ANYTHING TO END YOUR LIFE?: NO
2. HAVE YOU ACTUALLY HAD ANY THOUGHTS OF KILLING YOURSELF?: NO
1. IN THE PAST MONTH, HAVE YOU WISHED YOU WERE DEAD OR WISHED YOU COULD GO TO SLEEP AND NOT WAKE UP?: NO

## 2024-02-15 ASSESSMENT — PAIN - FUNCTIONAL ASSESSMENT
PAIN_FUNCTIONAL_ASSESSMENT: 0-10

## 2024-02-15 ASSESSMENT — PAIN SCALES - GENERAL
PAINLEVEL_OUTOF10: 0 - NO PAIN
PAIN_LEVEL: 0

## 2024-02-15 NOTE — ANESTHESIA POSTPROCEDURE EVALUATION
Patient: Selma Delacruz    Procedure Summary       Date: 02/15/24 Room / Location: [facility]    Anesthesia Start: 0803 Anesthesia Stop:     Procedure: COLONOSCOPY Diagnosis:       Screening for colon cancer      Screening for colon cancer    Scheduled Providers: Srinivas Miguel MD Responsible Provider: ISABELLE Novoa    Anesthesia Type: MAC ASA Status: 2            Anesthesia Type: MAC    Vitals Value Taken Time   /72 02/15/24 0830   Temp 36 02/15/24 0830   Pulse 91 02/15/24 0830   Resp 12 02/15/24 0830   SpO2 97 02/15/24 0830       Anesthesia Post Evaluation    Patient location during evaluation: bedside  Patient participation: complete - patient participated  Level of consciousness: awake  Pain score: 0  Pain management: adequate  Airway patency: patent  Cardiovascular status: acceptable  Respiratory status: acceptable and room air  Hydration status: acceptable  Postoperative Nausea and Vomiting: none        No notable events documented.

## 2024-02-15 NOTE — PRE-SEDATION DOCUMENTATION
Patient: Selma Delacruz  MRN: [id number]    Pre-sedation Evaluation:  Sedation necessary for: Analgesia  Requesting service: gi    History of Present Illness: screening colonoscopy     Past Medical History:   Diagnosis Date   • Anxiety 2000   • Personal history of other endocrine, nutritional and metabolic disease     History of high cholesterol       Principle problems:  Patient Active Problem List    Diagnosis Date Noted   • Anxiety 11/01/2023   • History of cold sores 11/01/2023   • Posttraumatic stress disorder 11/01/2023   • Age-related nuclear cataract, bilateral 09/19/2017   • Myopia, bilateral 09/19/2017   • Regular astigmatism, bilateral 09/19/2017     Allergies:  No Known Allergies  PTA/Current Medications:  (Not in a hospital admission)    Current Outpatient Medications   Medication Sig Dispense Refill   • ALPRAZolam (Xanax) 0.5 mg tablet Take 1 tablet (0.5 mg) by mouth every 8 hours if needed.     • buPROPion XL (Wellbutrin XL) 300 mg 24 hr tablet Take 1 tablet (300 mg) by mouth once daily. 90 tablet 3   • cetirizine (ZyrTEC) 10 mg tablet Take 1 tablet (10 mg) by mouth once daily. 90 tablet 3   • FLUoxetine (PROzac) 20 mg capsule TAKE 1 CAPSULE BY MOUTH EVERY DAY 90 capsule 1   • multivitamin tablet Take 1 tablet by mouth once daily.     • NON FORMULARY Elderberry + Vit D + Vit C tabs- Take 2 tabs by mouth daily.     • phentermine (Adipex-P) 37.5 mg tablet Take 1 tablet (37.5 mg) by mouth once daily in the morning. Take before meals. 30 tablet 0   • valACYclovir (Valtrex) 1 gram tablet Take 2 tablets by mouth twice daily as needed for cold sores.     • BIOTIN ORAL Biotin Capsule- Take 1 cap by mouth daily.  (Pt unsure of dosage)     • buPROPion XL (Wellbutrin XL) 150 mg 24 hr tablet Take 1 tablet (150 mg) by mouth once daily in the morning. Do not crush, chew, or split. 30 tablet 0     No current facility-administered medications for this encounter.     Past Surgical History:   has a past surgical history  that includes Other surgical history (2022); Other surgical history (2022); Breast surgery; Cosmetic surgery;  section, low transverse; Hysterectomy; and Tubal ligation.    Recent sedation/surgery (24 hours) No    Review of Systems:  Please check all that apply: No significant medical history    Pregnancy test completed prior to procedure on any menstruating female: none        NPO guidelines met: Yes    Physical Exam    Airway  Mallampati: I  TM distance: <3 FB  Neck ROM: full     Cardiovascular - normal exam     Dental - normal exam     Pulmonary - normal exam         Plan    ASA 1     Deep

## 2024-02-15 NOTE — ANESTHESIA PREPROCEDURE EVALUATION
Patient: Selma Delacruz    Procedure Information       Date/Time: 02/15/24 0800    Scheduled providers: Srinivas Miguel MD    Procedure: COLONOSCOPY    Location: [facility]            Relevant Problems   Neuro/Psych   (+) Anxiety   (+) Posttraumatic stress disorder       Clinical information reviewed:   Tobacco  Allergies  Meds  Problems  Med Hx  Surg Hx  OB Status           NPO Detail:  No data recorded     Physical Exam    Airway  Mallampati: II  TM distance: >3 FB  Neck ROM: full     Cardiovascular - normal exam     Dental    Pulmonary   Breath sounds clear to auscultation     Abdominal   Abdomen: soft  Bowel sounds: normal     Other findings: Preoxygenated 2L prior to procedure          Anesthesia Plan    History of general anesthesia?: yes  History of complications of general anesthesia?: no    ASA 2     MAC     The patient is not a current smoker.    intravenous induction   Anesthetic plan and risks discussed with patient.    Plan discussed with CRNA.

## 2024-02-15 NOTE — Clinical Note
Abd soft Vital signs stable. Arousable prior to transport. Patient transported to PACU via stretcher. Handoff completed.

## 2024-02-16 ENCOUNTER — OFFICE VISIT (OUTPATIENT)
Dept: ORTHOPEDIC SURGERY | Facility: CLINIC | Age: 49
End: 2024-02-16
Payer: OTHER GOVERNMENT

## 2024-02-16 VITALS — BODY MASS INDEX: 29.99 KG/M2 | HEIGHT: 65 IN | WEIGHT: 180 LBS

## 2024-02-16 DIAGNOSIS — M17.12 PATELLOFEMORAL ARTHRITIS OF LEFT KNEE: Primary | ICD-10-CM

## 2024-02-16 PROCEDURE — 99214 OFFICE O/P EST MOD 30 MIN: CPT | Performed by: STUDENT IN AN ORGANIZED HEALTH CARE EDUCATION/TRAINING PROGRAM

## 2024-02-16 PROCEDURE — 1036F TOBACCO NON-USER: CPT | Performed by: STUDENT IN AN ORGANIZED HEALTH CARE EDUCATION/TRAINING PROGRAM

## 2024-02-16 RX ORDER — LIRAGLUTIDE 6 MG/ML
INJECTION, SOLUTION SUBCUTANEOUS
COMMUNITY
Start: 2023-01-18 | End: 2024-02-21

## 2024-02-16 NOTE — PROGRESS NOTES
Chief Complaint   Patient presents with    Left Knee - Follow-up     MRI F/U       HPI  Patient presents today for follow up of left knee MRI results.  Patient is status post intra-articular injection will finish her trip coming up.  0 out of 10 today.  Cortisone injection provided her significant.  Date of injection 2/9/2024      Physical exam  General: Alert and oriented to place, person, and time.  No acute distress and breathing comfortably; pleasant and cooperative with the examination.  Extremity:  Focused examination left knee: Overlying skin is clean dry intact some crepitus with joint able exam mild tenderness to palpation peripatellar.  No appreciable effusion.  Neurovascular intact compartments are soft and compressible.    Diagnostics:  MR knee left wo IV contrast    Result Date: 2/14/2024  Interpreted By:  Antione Cerna, STUDY: MR KNEE LEFT WO IV CONTRAST;  2/14/2024 10:46 am   INDICATION: Signs/Symptoms:pain.   COMPARISON: None.   ACCESSION NUMBER(S): [id number]   ORDERING CLINICIAN: RORY ELI   TECHNIQUE: Multiplanar and multisequential MR images of the left knee are performed.   FINDINGS: There is a small joint effusion.   There is moderate thinning of the patellar articular cartilage more pronounced at the lower pole and patellar apex. Subcortical degenerative bone marrow signal is identified at the lateral facet and lower pole as well as the patellar apex towards the upper pole. There is mild thinning of the articular cartilage at the medial and lateral compartments though no focal defect. There is no sign of acute osseous fracture, bone marrow edema, or osseous mass. There has no loose osteochondral lesion.   The lateral and medial menisci are of normal morphology. There is no linear tear truncation of the menisci.   The anterior and posterior cruciate ligaments, lateral collateral ligament complex, popliteus tendon, medial collateral ligament, extensor complex, and patellar  retinacula are intact.   The surrounding soft tissues are unremarkable.       Moderate to severe chondromalacia patella.   No convincing evidence of meniscal tear.   No sign of acute ligament disruption.   No acute osseous abnormality.   Small joint effusion.   Signed by: Antione Cerna 2/14/2024 10:54 AM Dictation workstation:   [id number]    XR knee left 4+ views    Result Date: 2/6/2024  Interpreted By:  Chance Thomas, STUDY: XR KNEE LEFT 4+ VIEWS; ;  2/6/2024 9:22 am   INDICATION: Signs/Symptoms:Pain in left knee.   COMPARISON: None.   ACCESSION NUMBER(S): [id number]   ORDERING CLINICIAN: MIK PARRA   FINDINGS: Four views demonstrate normal alignment without joint space narrowing, fracture or suspicious osseous lesion. Findings are equivocal for effusion.       No acute osseous abnormality or significant degenerative joint space narrowing.   Findings are equivocal for effusion.     MACRO: None   Signed by: Chance Thomas 2/6/2024 10:49 AM Dictation workstation:   [id number]     Procedures  Procedures     Assessment:  49-year-old female with patellofemoral chondromalacia/arthritis mild to moderate    Treatment plan:  Constellation of findings discussed with patient detail  Recommend working on hip and hip strengthening to help offload the patellofemoral space discussed activities to avoid  Follow-up as needed  Discussed that if she fails to improve with physician directed home exercises activity  Patient may benefit from another injection discussion patellofemoral chondroplasty  Will continue with conservative treatment  All of the patient's questions concerns answered    I spent 35 minutes in the visit which includes: Face-to-face and non-face-to-face time working for this specific patient.  All time was on the date of service.  Total time in activities performed include the following: Preparing to see the patient(e.g., review of test, previous progress notes of mine or another provider;  obtaining and/or reviewing separately obtained history; performing a medically appropriate examination and/or counseling and educating the patient/family/caregiver; ordering medications, tests or procedures; referring and communicating with other healthcare professionals (not separately reported); documenting clinical information in the electronic and/or health record; independently interpreting results (not separately reported) and communicating results to the patient family caregiver care coordination (not separately reported).

## 2024-02-21 ENCOUNTER — OFFICE VISIT (OUTPATIENT)
Dept: PRIMARY CARE | Facility: CLINIC | Age: 49
End: 2024-02-21
Payer: OTHER GOVERNMENT

## 2024-02-21 VITALS
OXYGEN SATURATION: 100 % | WEIGHT: 190 LBS | DIASTOLIC BLOOD PRESSURE: 72 MMHG | HEART RATE: 96 BPM | SYSTOLIC BLOOD PRESSURE: 120 MMHG | TEMPERATURE: 98 F | HEIGHT: 65 IN | RESPIRATION RATE: 16 BRPM | BODY MASS INDEX: 31.65 KG/M2

## 2024-02-21 DIAGNOSIS — E66.9 OBESITY (BMI 30.0-34.9): ICD-10-CM

## 2024-02-21 PROCEDURE — 1036F TOBACCO NON-USER: CPT | Performed by: INTERNAL MEDICINE

## 2024-02-21 PROCEDURE — 99213 OFFICE O/P EST LOW 20 MIN: CPT | Performed by: INTERNAL MEDICINE

## 2024-02-21 RX ORDER — PHENTERMINE HYDROCHLORIDE 37.5 MG/1
37.5 TABLET ORAL
Qty: 30 TABLET | Refills: 0 | Status: SHIPPED | OUTPATIENT
Start: 2024-02-21 | End: 2024-03-15 | Stop reason: ALTCHOICE

## 2024-02-21 ASSESSMENT — PATIENT HEALTH QUESTIONNAIRE - PHQ9
SUM OF ALL RESPONSES TO PHQ9 QUESTIONS 1 AND 2: 0
1. LITTLE INTEREST OR PLEASURE IN DOING THINGS: NOT AT ALL
2. FEELING DOWN, DEPRESSED OR HOPELESS: NOT AT ALL

## 2024-02-21 NOTE — PROGRESS NOTES
Subjective   Selma Delacruz is a 49 y.o. female who presents for Weight Check.    HPI   Tolerating Adipex well.  Denies adverse s/e's.  Working on diet.  Unable to exercise x1 month d/t knee pain.    Down 3 pounds this month  Down 10 pounds in 2 months     Tolerating Wellbutrin 300 mg well.  Sx's well controlled at this time.    OARRS:  Lisa Hedrick,  on 2/21/2024  8:34 AM  I have personally reviewed the OARRS report for Selma Delacruz. I have considered the risks of abuse, dependence, addiction and diversion    Is the patient prescribed a combination of a benzodiazepine and opioid?  Yes, I feel it is clincially indicated to continue the medication and have discussed with the patient risks/benefits/alternatives.    Last Urine Drug Screen / ordered today: Yes  No results found for this or any previous visit (from the past 8760 hour(s)).  Results are as expected.     Clinical rationale for not completing a Urine Drug Screen: done      Controlled Substance Agreement:  Date of the Last Agreement: 12/20/2023  Reviewed Controlled Substance Agreement including but not limited to the benefits, risks, and alternatives to treatment with a Controlled Substance medication(s).    Stimulants:   What is the patient's goal of therapy? Weight loss  Is this being achieved with current treatment? Yes    Activities of Daily Living:   Is your overall impression that this patient is benefiting (symptom reduction outweighs side effects) from stimulant therapy? Yes     1. Physical Functioning: Better  2. Family Relationship: Better  3. Social Relationship: Better  4. Mood: Better  5. Sleep Patterns: Better  6. Overall Function: Better    Review of Systems    Health Maintenance Due   Topic Date Due    Hepatitis B Vaccines (1 of 3 - 3-dose series) Never done    HIV Screening  Never done    MMR Vaccines (1 of 1 - Standard series) Never done    Hepatitis C Screening  Never done    Diabetes Screening  Never done    DTaP/Tdap/Td Vaccines  "(1 - Tdap) 05/04/2019    Zoster Vaccines (1 of 2) Never done    Influenza Vaccine (1) Never done    COVID-19 Vaccine (3 - 2023-24 season) 09/01/2023       Objective   /72   Pulse 96   Temp 36.7 °C (98 °F)   Resp 16   Ht 1.651 m (5' 5\")   Wt 86.2 kg (190 lb)   SpO2 100%   BMI 31.62 kg/m²     Physical Exam    Assessment/Plan   Problem List Items Addressed This Visit    None  Visit Diagnoses       Obesity (BMI 30.0-34.9)        Relevant Medications    phentermine (Adipex-P) 37.5 mg tablet    tirzepatide, weight loss, (Zepbound) 2.5 mg/0.5 mL injection          I have personally reviewed patients OARRS report.  This report is scanned into the emr.  I have considered the risks of abuse, dependence, addiction and diversion.  Fu in 2 months       "

## 2024-02-22 ENCOUNTER — APPOINTMENT (OUTPATIENT)
Dept: PRIMARY CARE | Facility: CLINIC | Age: 49
End: 2024-02-22
Payer: OTHER GOVERNMENT

## 2024-02-22 LAB
LABORATORY COMMENT REPORT: NORMAL
PATH REPORT.FINAL DX SPEC: NORMAL
PATH REPORT.GROSS SPEC: NORMAL
PATH REPORT.TOTAL CANCER: NORMAL

## 2024-02-26 ENCOUNTER — TELEPHONE (OUTPATIENT)
Dept: GASTROENTEROLOGY | Facility: EXTERNAL LOCATION | Age: 49
End: 2024-02-26
Payer: OTHER GOVERNMENT

## 2024-03-15 DIAGNOSIS — E66.9 OBESITY (BMI 30.0-34.9): Primary | ICD-10-CM

## 2024-03-15 RX ORDER — PHENTERMINE AND TOPIRAMATE 7.5; 46 MG/1; MG/1
1 CAPSULE, EXTENDED RELEASE ORAL DAILY
Qty: 30 CAPSULE | Refills: 2 | Status: SHIPPED | OUTPATIENT
Start: 2024-03-15 | End: 2024-06-13

## 2024-03-15 RX ORDER — PHENTERMINE AND TOPIRAMATE 3.75; 23 MG/1; MG/1
1 CAPSULE, EXTENDED RELEASE ORAL DAILY
Qty: 14 CAPSULE | Refills: 0 | Status: SHIPPED | OUTPATIENT
Start: 2024-03-15 | End: 2024-03-29

## 2024-04-02 DIAGNOSIS — F32.A DEPRESSIVE DISORDER: ICD-10-CM

## 2024-04-03 RX ORDER — FLUOXETINE HYDROCHLORIDE 20 MG/1
20 CAPSULE ORAL DAILY
Qty: 90 CAPSULE | Refills: 3 | Status: SHIPPED | OUTPATIENT
Start: 2024-04-03 | End: 2024-04-26 | Stop reason: SDUPTHER

## 2024-04-26 ENCOUNTER — TELEPHONE (OUTPATIENT)
Dept: PRIMARY CARE | Facility: CLINIC | Age: 49
End: 2024-04-26

## 2024-04-26 ENCOUNTER — APPOINTMENT (OUTPATIENT)
Dept: PRIMARY CARE | Facility: CLINIC | Age: 49
End: 2024-04-26
Payer: OTHER GOVERNMENT

## 2024-04-26 DIAGNOSIS — Z00.00 HEALTHCARE MAINTENANCE: ICD-10-CM

## 2024-04-26 DIAGNOSIS — F32.A DEPRESSIVE DISORDER: ICD-10-CM

## 2024-04-26 DIAGNOSIS — F41.9 ANXIETY: ICD-10-CM

## 2024-04-26 NOTE — TELEPHONE ENCOUNTER
Pt is out of town and needs prescriptions sent to the following pharmacy:    Name:  Selma Delacruz  :  [id number]  Medication Name:  FLUoxetine (PROzac) 20 mg capsule   Medication Name:  buPROPion XL (Wellbutrin XL) 300 mg 24 hr tablet   Medication Name:  cetirizine (ZyrTEC) 10 mg tablet   Specific Pharmacy location:  [address]  Date of last appointment:  24  Date of next appointment:  24  Best number to reach patient:  [phone]

## 2024-04-29 RX ORDER — BUPROPION HYDROCHLORIDE 300 MG/1
300 TABLET ORAL DAILY
Qty: 90 TABLET | Refills: 0 | Status: SHIPPED | OUTPATIENT
Start: 2024-04-29

## 2024-04-29 RX ORDER — CETIRIZINE HYDROCHLORIDE 10 MG/1
10 TABLET ORAL DAILY
Qty: 90 TABLET | Refills: 0 | Status: SHIPPED | OUTPATIENT
Start: 2024-04-29 | End: 2025-04-29

## 2024-04-29 RX ORDER — FLUOXETINE HYDROCHLORIDE 20 MG/1
20 CAPSULE ORAL DAILY
Qty: 90 CAPSULE | Refills: 0 | Status: SHIPPED | OUTPATIENT
Start: 2024-04-29 | End: 2025-04-29

## 2024-05-10 ENCOUNTER — APPOINTMENT (OUTPATIENT)
Dept: ORTHOPEDIC SURGERY | Facility: CLINIC | Age: 49
End: 2024-05-10
Payer: OTHER GOVERNMENT

## 2024-05-22 ENCOUNTER — OFFICE VISIT (OUTPATIENT)
Dept: ORTHOPEDIC SURGERY | Facility: CLINIC | Age: 49
End: 2024-05-22
Payer: OTHER GOVERNMENT

## 2024-05-22 DIAGNOSIS — M17.12 PATELLOFEMORAL ARTHRITIS OF LEFT KNEE: Primary | ICD-10-CM

## 2024-05-22 PROCEDURE — 2500000004 HC RX 250 GENERAL PHARMACY W/ HCPCS (ALT 636 FOR OP/ED): Performed by: STUDENT IN AN ORGANIZED HEALTH CARE EDUCATION/TRAINING PROGRAM

## 2024-05-22 PROCEDURE — 99214 OFFICE O/P EST MOD 30 MIN: CPT | Performed by: STUDENT IN AN ORGANIZED HEALTH CARE EDUCATION/TRAINING PROGRAM

## 2024-05-22 PROCEDURE — 20610 DRAIN/INJ JOINT/BURSA W/O US: CPT | Performed by: STUDENT IN AN ORGANIZED HEALTH CARE EDUCATION/TRAINING PROGRAM

## 2024-05-22 PROCEDURE — 2500000005 HC RX 250 GENERAL PHARMACY W/O HCPCS: Performed by: STUDENT IN AN ORGANIZED HEALTH CARE EDUCATION/TRAINING PROGRAM

## 2024-05-22 RX ORDER — LIDOCAINE HYDROCHLORIDE 10 MG/ML
4 INJECTION INFILTRATION; PERINEURAL
Status: COMPLETED | OUTPATIENT
Start: 2024-05-22 | End: 2024-05-22

## 2024-05-22 RX ORDER — TRIAMCINOLONE ACETONIDE 40 MG/ML
40 INJECTION, SUSPENSION INTRA-ARTICULAR; INTRAMUSCULAR
Status: COMPLETED | OUTPATIENT
Start: 2024-05-22 | End: 2024-05-22

## 2024-05-22 RX ADMIN — TRIAMCINOLONE ACETONIDE 40 MG: 40 INJECTION, SUSPENSION INTRA-ARTICULAR; INTRAMUSCULAR at 11:11

## 2024-05-22 RX ADMIN — LIDOCAINE HYDROCHLORIDE 4 ML: 10 INJECTION, SOLUTION INFILTRATION; PERINEURAL at 11:11

## 2024-05-22 NOTE — PROGRESS NOTES
Chief Complaint   Patient presents with    Left Knee - Pain     Patellofemoral Chondromalacia / Arthritis requests Inj today       HPI  49-year-old female with known history of patellofemoral chondromalacia presents today for repeat cortisone injection.  Prior injection did provide her 3 months relief.  Has been having some pain about her knee.  Some days better than others.  Endorses some grinding about her knee which is quite painful.      Physical exam  General: Alert and oriented to place, person, and time.  No acute distress and breathing comfortably; pleasant and cooperative with the examination.  Extremity:  Left knee:  Skin healthy and intact  No gross swelling or ecchymosis  Alignment: No  Effusion: Mild  ROM: Full  Crepitance with range of motion  No pain with internal rotation of the hip  Tenderness to palpation: Peripatellar     No laxity to valgus stress  No laxity to varus stress  Negative Lachman´s test  Negative posterior drawer test  Mild pain with Liliam´s test     Neurovascular exam normal distally  2+ DP pulse and good cap refill  Diagnostics:  MR knee left wo IV contrast    Result Date: 2/14/2024  Interpreted By:  Antione Cerna, STUDY: MR KNEE LEFT WO IV CONTRAST;  2/14/2024 10:46 am   INDICATION: Signs/Symptoms:pain.   COMPARISON: None.   ACCESSION NUMBER(S): [id number]   ORDERING CLINICIAN: RORY ELI   TECHNIQUE: Multiplanar and multisequential MR images of the left knee are performed.   FINDINGS: There is a small joint effusion.   There is moderate thinning of the patellar articular cartilage more pronounced at the lower pole and patellar apex. Subcortical degenerative bone marrow signal is identified at the lateral facet and lower pole as well as the patellar apex towards the upper pole. There is mild thinning of the articular cartilage at the medial and lateral compartments though no focal defect. There is no sign of acute osseous fracture, bone marrow edema, or osseous  Patient returned call again      Thank you   mass. There has no loose osteochondral lesion.   The lateral and medial menisci are of normal morphology. There is no linear tear truncation of the menisci.   The anterior and posterior cruciate ligaments, lateral collateral ligament complex, popliteus tendon, medial collateral ligament, extensor complex, and patellar retinacula are intact.   The surrounding soft tissues are unremarkable.       Moderate to severe chondromalacia patella.   No convincing evidence of meniscal tear.   No sign of acute ligament disruption.   No acute osseous abnormality.   Small joint effusion.   Signed by: Antione Cerna 2/14/2024 10:54 AM Dictation workstation:   [id number]    XR knee left 4+ views    Result Date: 2/6/2024  Interpreted By:  Chance Thomas, STUDY: XR KNEE LEFT 4+ VIEWS; ;  2/6/2024 9:22 am   INDICATION: Signs/Symptoms:Pain in left knee.   COMPARISON: None.   ACCESSION NUMBER(S): [id number]   ORDERING CLINICIAN: MIK PARRA   FINDINGS: Four views demonstrate normal alignment without joint space narrowing, fracture or suspicious osseous lesion. Findings are equivocal for effusion.       No acute osseous abnormality or significant degenerative joint space narrowing.   Findings are equivocal for effusion.     MACRO: None   Signed by: Chance Thomas 2/6/2024 10:49 AM Dictation workstation:   [id number]     Procedures  L Inj/Asp: L knee on 5/22/2024 11:11 AM  Indications: pain  Details: 22 G needle, anterolateral approach  Medications: 40 mg triamcinolone acetonide 40 mg/mL; 4 mL lidocaine 10 mg/mL (1 %)  Consent was given by the patient. Immediately prior to procedure a time out was called to verify the correct patient, procedure, equipment, support staff and site/side marked as required. Patient was prepped and draped in the usual sterile fashion.            Assessment:  49-year-old female with patellofemoral chondromalacia/arthritis mild to moderate    Treatment plan:  Will proceed with an intra-articular  injection today  Discussed with patient that I will have her follow-up in 3 months time for further discussion of potential use of diagnostic knee arthroscopy, patellofemoral chondroplasty.  Discussed with her that I do not want to proceed with continued repeat cortisone injections as this can damage cartilage inherently.  I discussed risks and benefits of corticosteroid injection and the patient would like to proceed.  Discussed risks of skin depigmentation and the rare but possible intravascular injection of local anesthetic which can cause palpitations or arrhythmias. I discussed the possibility of a transient increase in blood sugar. I explained that the local anesthetic tends to work immediately, it may take 2-3 days for the full effect of the corticosteroid compound. Consent was obtained and side confirmed by the patient.

## 2024-05-28 ENCOUNTER — APPOINTMENT (OUTPATIENT)
Dept: ORTHOPEDIC SURGERY | Facility: CLINIC | Age: 49
End: 2024-05-28
Payer: OTHER GOVERNMENT

## 2024-06-17 ENCOUNTER — APPOINTMENT (OUTPATIENT)
Dept: PRIMARY CARE | Facility: CLINIC | Age: 49
End: 2024-06-17
Payer: OTHER GOVERNMENT

## 2024-06-17 VITALS
HEIGHT: 65 IN | TEMPERATURE: 98 F | WEIGHT: 182 LBS | OXYGEN SATURATION: 100 % | HEART RATE: 88 BPM | SYSTOLIC BLOOD PRESSURE: 116 MMHG | DIASTOLIC BLOOD PRESSURE: 72 MMHG | RESPIRATION RATE: 16 BRPM | BODY MASS INDEX: 30.32 KG/M2

## 2024-06-17 DIAGNOSIS — F41.9 ANXIETY: ICD-10-CM

## 2024-06-17 DIAGNOSIS — E66.9 OBESITY (BMI 30.0-34.9): Primary | ICD-10-CM

## 2024-06-17 PROCEDURE — 1036F TOBACCO NON-USER: CPT | Performed by: INTERNAL MEDICINE

## 2024-06-17 PROCEDURE — 99214 OFFICE O/P EST MOD 30 MIN: CPT | Performed by: INTERNAL MEDICINE

## 2024-06-17 RX ORDER — ALPRAZOLAM 0.5 MG/1
0.5 TABLET ORAL EVERY 8 HOURS PRN
Qty: 15 TABLET | Refills: 0 | Status: SHIPPED | OUTPATIENT
Start: 2024-06-17 | End: 2024-06-22

## 2024-06-17 ASSESSMENT — ENCOUNTER SYMPTOMS
FEVER: 0
COUGH: 0
SHORTNESS OF BREATH: 0
FATIGUE: 0

## 2024-06-17 NOTE — PROGRESS NOTES
Subjective   Selma Delacruz is a 49 y.o. female who presents for Obesity follow up.    HPI   Pt started Qsymia.  Increased to 7.5 mg.  Tolerated well.  Had jitters, difficulty falling asleep w/Adipex.  Has been off med x1 month.    OARRS:  Lisa Hedrick,  on 6/17/2024  4:27 PM  I have personally reviewed the OARRS report for Selma eDlacruz. I have considered the risks of abuse, dependence, addiction and diversion    Is the patient prescribed a combination of a benzodiazepine and opioid?  Yes, I feel it is clincially indicated to continue the medication and have discussed with the patient risks/benefits/alternatives.    Last Urine Drug Screen / ordered today: Yes  No results found for this or any previous visit (from the past 8760 hour(s)).  Results are as expected.     Clinical rationale for not completing a Urine Drug Screen: today      Controlled Substance Agreement:  Date of the Last Agreement: today  Reviewed Controlled Substance Agreement including but not limited to the benefits, risks, and alternatives to treatment with a Controlled Substance medication(s).    Benzodiazepines:  What is the patient's goal of therapy? Anxiety relief  Is this being achieved with current treatment? Yes    SALVADOR-7:  No data recorded    Activities of Daily Living:   Is your overall impression that this patient is benefiting (symptom reduction outweighs side effects) from benzodiazepine therapy? Yes     1. Physical Functioning: Better  2. Family Relationship: Better  3. Social Relationship: Better  4. Mood: Better  5. Sleep Patterns: Better  6. Overall Function: Better  Review of Systems   Constitutional:  Negative for fatigue and fever.   Respiratory:  Negative for cough and shortness of breath.    Cardiovascular:  Negative for chest pain and leg swelling.   All other systems reviewed and are negative.      Health Maintenance Due   Topic Date Due    HIV Screening  Never done    MMR Vaccines (1 of 1 - Standard series) Never  "done    Hepatitis C Screening  Never done    Diabetes Screening  Never done    Hepatitis B Vaccines (1 of 3 - 19+ 3-dose series) Never done    DTaP/Tdap/Td Vaccines (1 - Tdap) 05/04/2019    Zoster Vaccines (1 of 2) Never done    COVID-19 Vaccine (3 - 2023-24 season) 09/01/2023       Objective   /72   Pulse 88   Temp 36.7 °C (98 °F)   Resp 16   Ht 1.651 m (5' 5\")   Wt 82.6 kg (182 lb)   SpO2 100%   BMI 30.29 kg/m²     Physical Exam  Vitals and nursing note reviewed.   Constitutional:       Appearance: Normal appearance.   HENT:      Head: Normocephalic.   Eyes:      Conjunctiva/sclera: Conjunctivae normal.      Pupils: Pupils are equal, round, and reactive to light.   Cardiovascular:      Rate and Rhythm: Normal rate and regular rhythm.      Pulses: Normal pulses.      Heart sounds: Normal heart sounds.   Pulmonary:      Effort: Pulmonary effort is normal.      Breath sounds: Normal breath sounds.   Musculoskeletal:         General: No swelling.      Cervical back: Neck supple.   Skin:     General: Skin is warm and dry.   Neurological:      General: No focal deficit present.      Mental Status: She is oriented to person, place, and time.         Assessment/Plan   Problem List Items Addressed This Visit       Anxiety    Relevant Medications    ALPRAZolam (Xanax) 0.5 mg tablet     Other Visit Diagnoses       Obesity (BMI 30.0-34.9)    -  Primary    Relevant Medications    tirzepatide, weight loss, (Zepbound) 2.5 mg/0.5 mL injection          I have personally reviewed patients OARRS report.  This report is scanned into the emr.  I have considered the risks of abuse, dependence, addiction and diversion.    Can't take contrave  Side effects from adipex and qsymia  Trial of zepbound        "

## 2024-07-12 DIAGNOSIS — E66.9 OBESITY (BMI 30.0-34.9): Primary | ICD-10-CM

## 2024-07-15 DIAGNOSIS — F41.9 ANXIETY: ICD-10-CM

## 2024-07-15 RX ORDER — BUPROPION HYDROCHLORIDE 300 MG/1
300 TABLET ORAL DAILY
Qty: 90 TABLET | Refills: 0 | Status: SHIPPED | OUTPATIENT
Start: 2024-07-15

## 2024-07-31 ENCOUNTER — OFFICE VISIT (OUTPATIENT)
Dept: ORTHOPEDIC SURGERY | Facility: CLINIC | Age: 49
End: 2024-07-31
Payer: OTHER GOVERNMENT

## 2024-07-31 DIAGNOSIS — M22.42 CHONDROMALACIA OF LEFT PATELLOFEMORAL JOINT: Primary | ICD-10-CM

## 2024-07-31 PROCEDURE — 1036F TOBACCO NON-USER: CPT | Performed by: STUDENT IN AN ORGANIZED HEALTH CARE EDUCATION/TRAINING PROGRAM

## 2024-07-31 PROCEDURE — 99214 OFFICE O/P EST MOD 30 MIN: CPT | Performed by: STUDENT IN AN ORGANIZED HEALTH CARE EDUCATION/TRAINING PROGRAM

## 2024-07-31 RX ORDER — OXYCODONE AND ACETAMINOPHEN 5; 325 MG/1; MG/1
1 TABLET ORAL EVERY 8 HOURS PRN
Qty: 20 TABLET | Refills: 0 | Status: SHIPPED | OUTPATIENT
Start: 2024-07-31 | End: 2024-08-07

## 2024-07-31 RX ORDER — HYDROCODONE BITARTRATE AND ACETAMINOPHEN 5; 325 MG/1; MG/1
1 TABLET ORAL EVERY 6 HOURS PRN
Qty: 21 TABLET | Refills: 0 | Status: SHIPPED | OUTPATIENT
Start: 2024-07-31 | End: 2024-08-07

## 2024-07-31 RX ORDER — NAPROXEN 500 MG/1
500 TABLET ORAL 2 TIMES DAILY
Qty: 60 TABLET | Refills: 0 | Status: SHIPPED | OUTPATIENT
Start: 2024-07-31 | End: 2024-08-30

## 2024-07-31 NOTE — PROGRESS NOTES
Chief Complaint   Patient presents with    Left Knee - Follow-up     Patellofemoral Chondromalacia / Arthritis s/p cortisone inj 05/22/2024       HPI  49-year-old female with known history of patellofemoral chondromalacia presents today for repeat evaluation of her left.  Patient has had persistent ongoing pain and mechanical symptoms.  She has attempted the following: Activity modifications, anti-inflammatories, cortisone injections.  Prior injections have provided her with up to 3 months relief.  Last visit 5/22/2024 she did receive a repeat cortisone injection which she states did not provide her with as much relief as she has in the past.  States she has still experienced discomfort throughout the day with increased pain and mechanical symptoms with activities.  Some days better than others.  Endorses some grinding about her knee which is quite painful.  Continues to deny any numbness or tingling to the distal lower extremity.  Presenting today for discussion of alternative treatment options.      Physical exam  General: Alert and oriented to place, person, and time.  No acute distress and breathing comfortably; pleasant and cooperative with the examination.  Extremity:  Left knee:  Skin healthy and intact  No gross swelling or ecchymosis  Alignment: No  Effusion: Mild  ROM: Full  Crepitance with range of motion  No pain with internal rotation of the hip  Tenderness to palpation: Peripatellar     No laxity to valgus stress  No laxity to varus stress  Negative Lachman´s test  Negative posterior drawer test  Mild pain with Liliam´s test     Neurovascular exam normal distally  2+ DP pulse and good cap refill  Diagnostics:  MR knee left wo IV contrast    Result Date: 2/14/2024  Interpreted By:  Antione Cerna, STUDY: MR KNEE LEFT WO IV CONTRAST;  2/14/2024 10:46 am   INDICATION: Signs/Symptoms:pain.   COMPARISON: None.   ACCESSION NUMBER(S): [id number]   ORDERING CLINICIAN: RORY ELI   TECHNIQUE:  Multiplanar and multisequential MR images of the left knee are performed.   FINDINGS: There is a small joint effusion.   There is moderate thinning of the patellar articular cartilage more pronounced at the lower pole and patellar apex. Subcortical degenerative bone marrow signal is identified at the lateral facet and lower pole as well as the patellar apex towards the upper pole. There is mild thinning of the articular cartilage at the medial and lateral compartments though no focal defect. There is no sign of acute osseous fracture, bone marrow edema, or osseous mass. There has no loose osteochondral lesion.   The lateral and medial menisci are of normal morphology. There is no linear tear truncation of the menisci.   The anterior and posterior cruciate ligaments, lateral collateral ligament complex, popliteus tendon, medial collateral ligament, extensor complex, and patellar retinacula are intact.   The surrounding soft tissues are unremarkable.       Moderate to severe chondromalacia patella.   No convincing evidence of meniscal tear.   No sign of acute ligament disruption.   No acute osseous abnormality.   Small joint effusion.   Signed by: Antione Cerna 2/14/2024 10:54 AM Dictation workstation:   [id number]    XR knee left 4+ views    Result Date: 2/6/2024  Interpreted By:  Chance Thomas, STUDY: XR KNEE LEFT 4+ VIEWS; ;  2/6/2024 9:22 am   INDICATION: Signs/Symptoms:Pain in left knee.   COMPARISON: None.   ACCESSION NUMBER(S): [id number]   ORDERING CLINICIAN: MIK PARRA   FINDINGS: Four views demonstrate normal alignment without joint space narrowing, fracture or suspicious osseous lesion. Findings are equivocal for effusion.       No acute osseous abnormality or significant degenerative joint space narrowing.   Findings are equivocal for effusion.     MACRO: None   Signed by: Chance Thomas 2/6/2024 10:49 AM Dictation workstation:   [id number]     Procedures  Procedures      Assessment:  49-year-old female with patellofemoral chondromalacia/arthritis mild to moderate    Treatment plan:  Treatment modalities discussed with patient at length today including anti-inflammatories, bracing, physical therapy, cortisone injections, gel injections, knee arthroscopy, ultimately total knee arthroplasty as well as risk benefits and alternatives to each  Reviewed with patient risk of continued repeat cortisone injections  Discussed with patient that she is still having persistent symptoms despite conservative treatment we may proceed with diagnostic knee arthroscopy, patellofemoral chondroplasty.  Discussed with patient risk benefits and limitations of this procedure.  Patient elects to proceed with diagnostic knee arthroscopy, patellofemoral chondroplasty  Please refer to Dr. Aguilar's portion of this note for further surgical planning    Sebastian Salcido PA-C    In a face to face encounter, I evaluated the patient and performed a physical examination, discussed pertinent diagnostic studies if indicated and discussed diagnosis and management strategies with both the patient and physician assistant / nurse practitioner.  I reviewed the PA/NP's note and agree with the documented findings and plan of care.     Constellation of findings discussed with Selma in detail today she is attempted activity modifications, physical therapy, gel injections with little to no relief.  Does wish to proceed with diagnostic knee arthroscopy, patellofemoral chondroplasty.  Discussed that this may not alleviate all of her symptoms as she does have some arthritis is demonstrated on MRI however she does wish to avoid total knee arthroplasty at this point in time.  Will obtain medical clearance prior to proceeding.  MRI reviewed with Qi in detail we will take a close look at the medial lateral meniscus to ensure there is also no mechanical component contributing with knee arthroscopy.    We discussed the options of  operative versus nonoperative management with the attendant risks and benefits and the patient is interested in surgical treatment. I have discussed the alternatives of continued nonoperative treatment with observation, physical therapy, and / or medication versus surgery with the patient and we have thoughtfully considered these options. The patient wishes to proceed with surgical treatment.     We will proceed with left knee diagnostic arthroscopy and patellofemoral chondroplasty    The planned surgical procedure includes examination under anesthesia. Anesthetic risks will be discussed with the patient by the anesthetist. Arthroscopic evaluation will be performed of the knee joint.  Then an arthroscopic procedure will be performed which may include debridement or repair of the the meniscus or cartilage, synovectomy, and removal of loose bodies.  In addition, if there are advanced degenerative changes I have advised the patient that this may indeed be a progressive process, ultimately resulting in further pain at some point in the future.    Risks of the procedure include but are not limited to infection, bleeding, persistent pain, compartment syndrome, neurologic injury, pressure sores or burns related to positioning or equipment, failure of repair if performed, weakness, arthorfibrosis or stiffness of the joint, limited function and/or limited use of the extremity. The rare complication of death was discussed with the patient. Also, the possible need for revision surgery was discussed.     All this was discussed with the patient and consent obtained. All questions were answered. The patient understands and will consider the surgical options with the above risks in mind. If repair is performed of the meniscus a brace may be provided as  part of a treatment plan which will include wearing the immobilizer at all times.    Patient was given Norco for postoperative pain control.  We will pick this up prior to surgery  so that they are not looking for during the day of surgery. I have personally reviewed the OARRS report for this patient. This report is scanned into  the electronic medical record. I have considered the risks of abuse, dependence, addiction, and diversion. They currently report a pain of 8.    Regarding DVT prophylaxis, recommend generalized ambulation          Chandler Aguilar III, MD

## 2024-08-02 PROBLEM — M22.42 CHONDROMALACIA PATELLAE, LEFT KNEE: Status: ACTIVE | Noted: 2024-09-13

## 2024-08-09 ENCOUNTER — TELEPHONE (OUTPATIENT)
Dept: PRIMARY CARE | Facility: CLINIC | Age: 49
End: 2024-08-09
Payer: OTHER GOVERNMENT

## 2024-08-09 NOTE — TELEPHONE ENCOUNTER
Pt called and said she needs a refill on this medication ASAP    Fluoxetine (Prozac) 20mg capsule    [facility] [address]     Please advise, thanks!

## 2024-08-12 DIAGNOSIS — F32.A DEPRESSIVE DISORDER: ICD-10-CM

## 2024-08-12 RX ORDER — FLUOXETINE HYDROCHLORIDE 20 MG/1
20 CAPSULE ORAL DAILY
Qty: 90 CAPSULE | Refills: 0 | Status: SHIPPED | OUTPATIENT
Start: 2024-08-12 | End: 2025-08-12

## 2024-08-19 ENCOUNTER — APPOINTMENT (OUTPATIENT)
Dept: PLASTIC SURGERY | Facility: CLINIC | Age: 49
End: 2024-08-19
Payer: OTHER GOVERNMENT

## 2024-08-19 ENCOUNTER — APPOINTMENT (OUTPATIENT)
Dept: ORTHOPEDIC SURGERY | Facility: CLINIC | Age: 49
End: 2024-08-19
Payer: OTHER GOVERNMENT

## 2024-08-19 VITALS
HEART RATE: 101 BPM | HEIGHT: 65 IN | DIASTOLIC BLOOD PRESSURE: 75 MMHG | WEIGHT: 183 LBS | SYSTOLIC BLOOD PRESSURE: 108 MMHG | BODY MASS INDEX: 30.49 KG/M2

## 2024-08-19 DIAGNOSIS — Z41.1 ENCOUNTER FOR BREAST AUGMENTATION: Primary | ICD-10-CM

## 2024-08-19 PROCEDURE — 3008F BODY MASS INDEX DOCD: CPT | Performed by: SURGERY

## 2024-08-19 NOTE — PATIENT INSTRUCTIONS
We discussed the need for possible mastopexy/breast lift, we discussed that a breast lift with a breast augmentation may be split into 2 procedures, and in rare instances it may be performed in 1 procedure.  We discussed at great length that performing any cosmetic surgery or skin excision surgery is ideal when you have achieved your ideal weight.

## 2024-08-19 NOTE — PROGRESS NOTES
Division of Plastic & Reconstructive Surgery            New patient visit visit    Date: 2024   Referring Provider:   Lisa Hedrick, DO  [address] [place] [address]      Subjective   Selma Delacruz is a 49 y.o. female who was referred by Lisa Hedrick  for discussion regarding revision of breast augmentation.  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  She had breast augmentation performed in  with 385 ml (into 350 cc sized saline implants) by [facility] performed in the submuscular plane.  At that time she weighed 140 pounds.  She indicates that she had a pregnancy in  and noticed the right side deflation at that time and has never sought revision thereafter.    Past surgical history is notable for , bilateral breast augmentation as mentioned in ,  x 2.  Past medical history is noncontributory, she has never had a blood clot, she quit smoking 10 years ago, she does not have a history of diabetes mellitus.    Of note she is taking Zepbound injections for weight loss, she started at 200 pounds, and is currently down to 183 pounds, indicates that her goal is 140 pounds.      Objective    Vitals:    24 0941   BP: 108/75   Pulse: 101       Gen: interactive and pleasant  Head: NCAT  Eyes: EOMI, PERRLA  Mouth: MMM  Throat: trachea midline  Cor: RRR  Pulm: nonlabored breathing  Abd: s/nt/nd  Neuro: AAOx3  Ext: extremities perfused    Body mass index is 30.45 kg/m².      Focused exam of the: Bilateral breast:                                R                  L  SN:NAC             27.5 cm              28.5 cm  NAC:IMF            10 cm             10 cm         BW                    15 cm              15 cm        NAC diam         4.5 cm              4 cm               UPP                   2 cm              2 cm                                                                                   Ptosis Grade     II               II*notable waterfall  deformity of left breast                                                       Bra Size         unknown    Last mammogram was 11/2/2023 BI-RADS Category 2    Assessment/Plan       Selma is a 49 y.o. female who presents for discussion regarding breast augmentation revision status post right side submuscular breast augmentation with saline implant, 385 cc performed in 2000, complicated by right side rupture in 2009.    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  I do believe she would require a mastopexy, and we discussed possibly performing staging.  I would not be sure if this will be required in a staged fashion until we see what excess skin and laxity she has when she achieves her goal weight.  To that end I would like to follow-up with her in 2 to 3 months to see how her exam is changed with her continued weight loss.    Plan:   Follow-up visit in 2 to 3 months.    I spent 30 minutes with this patient. Greater than 50% of this time was spent in the counselling and/or coordination of care of this patient.  This note was created using voice recognition software and was not corrected for typographical or grammatical errors.    Signature: Clifford Evans MD   Date: 8/19/2024

## 2024-08-22 ENCOUNTER — APPOINTMENT (OUTPATIENT)
Dept: PLASTIC SURGERY | Facility: CLINIC | Age: 49
End: 2024-08-22
Payer: OTHER GOVERNMENT

## 2024-08-27 DIAGNOSIS — M22.42 CHONDROMALACIA OF LEFT PATELLOFEMORAL JOINT: ICD-10-CM

## 2024-08-27 RX ORDER — NAPROXEN 500 MG/1
500 TABLET ORAL 2 TIMES DAILY
Qty: 60 TABLET | Refills: 0 | Status: SHIPPED | OUTPATIENT
Start: 2024-08-27

## 2024-08-28 ENCOUNTER — LAB (OUTPATIENT)
Dept: LAB | Facility: LAB | Age: 49
End: 2024-08-28
Payer: OTHER GOVERNMENT

## 2024-08-28 ENCOUNTER — APPOINTMENT (OUTPATIENT)
Dept: PRIMARY CARE | Facility: CLINIC | Age: 49
End: 2024-08-28
Payer: OTHER GOVERNMENT

## 2024-08-28 VITALS
RESPIRATION RATE: 16 BRPM | HEIGHT: 65 IN | HEART RATE: 84 BPM | DIASTOLIC BLOOD PRESSURE: 70 MMHG | TEMPERATURE: 98.3 F | SYSTOLIC BLOOD PRESSURE: 100 MMHG | OXYGEN SATURATION: 100 % | WEIGHT: 173 LBS | BODY MASS INDEX: 28.82 KG/M2

## 2024-08-28 DIAGNOSIS — Z01.818 PRE-OP EXAMINATION: ICD-10-CM

## 2024-08-28 DIAGNOSIS — Z01.818 PREOPERATIVE CLEARANCE: ICD-10-CM

## 2024-08-28 DIAGNOSIS — M22.42 CHONDROMALACIA PATELLAE, LEFT KNEE: Primary | ICD-10-CM

## 2024-08-28 LAB
ALBUMIN SERPL BCP-MCNC: 4.3 G/DL (ref 3.4–5)
ALP SERPL-CCNC: 61 U/L (ref 33–110)
ALT SERPL W P-5'-P-CCNC: 21 U/L (ref 7–45)
ANION GAP SERPL CALC-SCNC: 11 MMOL/L (ref 10–20)
APTT PPP: 34 SECONDS (ref 27–38)
AST SERPL W P-5'-P-CCNC: 16 U/L (ref 9–39)
BASOPHILS # BLD AUTO: 0.06 X10*3/UL (ref 0–0.1)
BASOPHILS NFR BLD AUTO: 1.2 %
BILIRUB SERPL-MCNC: 0.5 MG/DL (ref 0–1.2)
BUN SERPL-MCNC: 12 MG/DL (ref 6–23)
CALCIUM SERPL-MCNC: 9.6 MG/DL (ref 8.6–10.3)
CHLORIDE SERPL-SCNC: 102 MMOL/L (ref 98–107)
CO2 SERPL-SCNC: 29 MMOL/L (ref 21–32)
CREAT SERPL-MCNC: 0.77 MG/DL (ref 0.5–1.05)
EGFRCR SERPLBLD CKD-EPI 2021: >90 ML/MIN/1.73M*2
EOSINOPHIL # BLD AUTO: 0.13 X10*3/UL (ref 0–0.7)
EOSINOPHIL NFR BLD AUTO: 2.5 %
ERYTHROCYTE [DISTWIDTH] IN BLOOD BY AUTOMATED COUNT: 11.7 % (ref 11.5–14.5)
GLUCOSE SERPL-MCNC: 84 MG/DL (ref 74–99)
HCT VFR BLD AUTO: 38.9 % (ref 36–46)
HGB BLD-MCNC: 12.8 G/DL (ref 12–16)
IMM GRANULOCYTES # BLD AUTO: 0.01 X10*3/UL (ref 0–0.7)
IMM GRANULOCYTES NFR BLD AUTO: 0.2 % (ref 0–0.9)
INR PPP: 1 (ref 0.9–1.1)
LYMPHOCYTES # BLD AUTO: 1.89 X10*3/UL (ref 1.2–4.8)
LYMPHOCYTES NFR BLD AUTO: 36.8 %
MCH RBC QN AUTO: 31 PG (ref 26–34)
MCHC RBC AUTO-ENTMCNC: 32.9 G/DL (ref 32–36)
MCV RBC AUTO: 94 FL (ref 80–100)
MONOCYTES # BLD AUTO: 0.33 X10*3/UL (ref 0.1–1)
MONOCYTES NFR BLD AUTO: 6.4 %
NEUTROPHILS # BLD AUTO: 2.72 X10*3/UL (ref 1.2–7.7)
NEUTROPHILS NFR BLD AUTO: 52.9 %
NRBC BLD-RTO: 0 /100 WBCS (ref 0–0)
PLATELET # BLD AUTO: 309 X10*3/UL (ref 150–450)
POTASSIUM SERPL-SCNC: 4.4 MMOL/L (ref 3.5–5.3)
PROT SERPL-MCNC: 6.5 G/DL (ref 6.4–8.2)
PROTHROMBIN TIME: 11 SECONDS (ref 9.8–12.8)
RBC # BLD AUTO: 4.13 X10*6/UL (ref 4–5.2)
SODIUM SERPL-SCNC: 138 MMOL/L (ref 136–145)
WBC # BLD AUTO: 5.1 X10*3/UL (ref 4.4–11.3)

## 2024-08-28 PROCEDURE — 99214 OFFICE O/P EST MOD 30 MIN: CPT | Performed by: INTERNAL MEDICINE

## 2024-08-28 PROCEDURE — 93000 ELECTROCARDIOGRAM COMPLETE: CPT | Performed by: INTERNAL MEDICINE

## 2024-08-28 PROCEDURE — 36415 COLL VENOUS BLD VENIPUNCTURE: CPT

## 2024-08-28 PROCEDURE — 3008F BODY MASS INDEX DOCD: CPT | Performed by: INTERNAL MEDICINE

## 2024-08-28 PROCEDURE — 1036F TOBACCO NON-USER: CPT | Performed by: INTERNAL MEDICINE

## 2024-08-28 ASSESSMENT — PATIENT HEALTH QUESTIONNAIRE - PHQ9
2. FEELING DOWN, DEPRESSED OR HOPELESS: NOT AT ALL
SUM OF ALL RESPONSES TO PHQ9 QUESTIONS 1 AND 2: 0
1. LITTLE INTEREST OR PLEASURE IN DOING THINGS: NOT AT ALL

## 2024-08-28 ASSESSMENT — ENCOUNTER SYMPTOMS
FEVER: 0
DIARRHEA: 0
EYE ITCHING: 0
RHINORRHEA: 0
ABDOMINAL PAIN: 0
SHORTNESS OF BREATH: 0
COUGH: 0
FREQUENCY: 0
PSYCHIATRIC NEGATIVE: 1
BACK PAIN: 0
NAUSEA: 0
WHEEZING: 0
EYE REDNESS: 0
WEAKNESS: 0
SORE THROAT: 0
DIFFICULTY URINATING: 0
HEADACHES: 0
ARTHRALGIAS: 0
CONSTIPATION: 0
EYE PAIN: 0
UNEXPECTED WEIGHT CHANGE: 0
DYSURIA: 0
FATIGUE: 0
PALPITATIONS: 0

## 2024-08-28 NOTE — PROGRESS NOTES
"Subjective   Selma Delacruz is a 49 y.o. female who presents for Pre-op Exam.    HPI   Scheduled for arthroscopy L knee on 9/13/24 @ [facility] w/Dr. Aguilar.  Denies adverse reaction to anesthesia.  Denies recent URI, chest pain, SOB, heart palps, edema, easy bruising/bleeding, epistaxis.  No post op concerns.    Review of Systems   Constitutional:  Negative for fatigue, fever and unexpected weight change.   HENT:  Negative for congestion, ear pain, rhinorrhea and sore throat.    Eyes:  Negative for pain, redness and itching.   Respiratory:  Negative for cough, shortness of breath and wheezing.    Cardiovascular:  Negative for chest pain, palpitations and leg swelling.   Gastrointestinal:  Negative for abdominal pain, constipation, diarrhea and nausea.   Genitourinary:  Negative for difficulty urinating, dysuria and frequency.   Musculoskeletal:  Negative for arthralgias and back pain.   Allergic/Immunologic: Negative for environmental allergies, food allergies and immunocompromised state.   Neurological:  Negative for weakness and headaches.   Psychiatric/Behavioral: Negative.     All other systems reviewed and are negative.      Health Maintenance Due   Topic Date Due    HIV Screening  Never done    MMR Vaccines (1 of 1 - Standard series) Never done    Hepatitis C Screening  Never done    Diabetes Screening  Never done    Hepatitis B Vaccines (1 of 3 - 19+ 3-dose series) Never done    DTaP/Tdap/Td Vaccines (1 - Tdap) 05/04/2019    Zoster Vaccines (1 of 2) Never done    COVID-19 Vaccine (3 - 2023-24 season) 09/01/2023    Influenza Vaccine (1) 09/01/2024       Objective   /70   Pulse 84   Temp 36.8 °C (98.3 °F)   Resp 16   Ht 1.651 m (5' 5\")   Wt 78.5 kg (173 lb)   SpO2 100%   BMI 28.79 kg/m²     Physical Exam  Vitals and nursing note reviewed.   Constitutional:       Appearance: Normal appearance.   HENT:      Head: Normocephalic.   Eyes:      Conjunctiva/sclera: Conjunctivae normal.      Pupils: Pupils " are equal, round, and reactive to light.   Cardiovascular:      Rate and Rhythm: Normal rate and regular rhythm.      Pulses: Normal pulses.      Heart sounds: Normal heart sounds.   Pulmonary:      Effort: Pulmonary effort is normal.      Breath sounds: Normal breath sounds.   Musculoskeletal:         General: No swelling.      Cervical back: Neck supple.   Skin:     General: Skin is warm and dry.   Neurological:      General: No focal deficit present.      Mental Status: She is oriented to person, place, and time.         Assessment/Plan   Problem List Items Addressed This Visit       RESOLVED: Chondromalacia patellae, left knee - Primary     Other Visit Diagnoses       Preoperative clearance        Relevant Orders    ECG 12 lead (Clinic Performed) (Completed)          Pt is acceptable medical risk for knee surgery  Holding zepbound x 7 days  Holding nsaids and supplements x 7 days  Stable based on symptoms and exam.  Continue established treatment plan and follow up at least yearly.

## 2024-08-28 NOTE — H&P (VIEW-ONLY)
"Subjective   Selma Delacruz is a 49 y.o. female who presents for Pre-op Exam.    HPI   Scheduled for arthroscopy L knee on 9/13/24 @ [facility] w/Dr. Aguilar.  Denies adverse reaction to anesthesia.  Denies recent URI, chest pain, SOB, heart palps, edema, easy bruising/bleeding, epistaxis.  No post op concerns.    Review of Systems   Constitutional:  Negative for fatigue, fever and unexpected weight change.   HENT:  Negative for congestion, ear pain, rhinorrhea and sore throat.    Eyes:  Negative for pain, redness and itching.   Respiratory:  Negative for cough, shortness of breath and wheezing.    Cardiovascular:  Negative for chest pain, palpitations and leg swelling.   Gastrointestinal:  Negative for abdominal pain, constipation, diarrhea and nausea.   Genitourinary:  Negative for difficulty urinating, dysuria and frequency.   Musculoskeletal:  Negative for arthralgias and back pain.   Allergic/Immunologic: Negative for environmental allergies, food allergies and immunocompromised state.   Neurological:  Negative for weakness and headaches.   Psychiatric/Behavioral: Negative.     All other systems reviewed and are negative.      Health Maintenance Due   Topic Date Due    HIV Screening  Never done    MMR Vaccines (1 of 1 - Standard series) Never done    Hepatitis C Screening  Never done    Diabetes Screening  Never done    Hepatitis B Vaccines (1 of 3 - 19+ 3-dose series) Never done    DTaP/Tdap/Td Vaccines (1 - Tdap) 05/04/2019    Zoster Vaccines (1 of 2) Never done    COVID-19 Vaccine (3 - 2023-24 season) 09/01/2023    Influenza Vaccine (1) 09/01/2024       Objective   /70   Pulse 84   Temp 36.8 °C (98.3 °F)   Resp 16   Ht 1.651 m (5' 5\")   Wt 78.5 kg (173 lb)   SpO2 100%   BMI 28.79 kg/m²     Physical Exam  Vitals and nursing note reviewed.   Constitutional:       Appearance: Normal appearance.   HENT:      Head: Normocephalic.   Eyes:      Conjunctiva/sclera: Conjunctivae normal.      Pupils: Pupils " are equal, round, and reactive to light.   Cardiovascular:      Rate and Rhythm: Normal rate and regular rhythm.      Pulses: Normal pulses.      Heart sounds: Normal heart sounds.   Pulmonary:      Effort: Pulmonary effort is normal.      Breath sounds: Normal breath sounds.   Musculoskeletal:         General: No swelling.      Cervical back: Neck supple.   Skin:     General: Skin is warm and dry.   Neurological:      General: No focal deficit present.      Mental Status: She is oriented to person, place, and time.         Assessment/Plan   Problem List Items Addressed This Visit       RESOLVED: Chondromalacia patellae, left knee - Primary     Other Visit Diagnoses       Preoperative clearance        Relevant Orders    ECG 12 lead (Clinic Performed) (Completed)          Pt is acceptable medical risk for knee surgery  Holding zepbound x 7 days  Holding nsaids and supplements x 7 days  Stable based on symptoms and exam.  Continue established treatment plan and follow up at least yearly.

## 2024-09-05 PROBLEM — M22.42 CHONDROMALACIA PATELLAE, LEFT KNEE: Status: ACTIVE | Noted: 2024-09-13

## 2024-09-06 DIAGNOSIS — E66.9 OBESITY (BMI 30.0-34.9): Primary | ICD-10-CM

## 2024-09-13 ENCOUNTER — HOSPITAL ENCOUNTER (OUTPATIENT)
Facility: HOSPITAL | Age: 49
Setting detail: OUTPATIENT SURGERY
Discharge: HOME | End: 2024-09-13
Attending: STUDENT IN AN ORGANIZED HEALTH CARE EDUCATION/TRAINING PROGRAM | Admitting: STUDENT IN AN ORGANIZED HEALTH CARE EDUCATION/TRAINING PROGRAM
Payer: OTHER GOVERNMENT

## 2024-09-13 ENCOUNTER — ANESTHESIA EVENT (OUTPATIENT)
Dept: OPERATING ROOM | Facility: HOSPITAL | Age: 49
End: 2024-09-13
Payer: OTHER GOVERNMENT

## 2024-09-13 ENCOUNTER — ANESTHESIA (OUTPATIENT)
Dept: OPERATING ROOM | Facility: HOSPITAL | Age: 49
End: 2024-09-13
Payer: OTHER GOVERNMENT

## 2024-09-13 VITALS
OXYGEN SATURATION: 94 % | HEART RATE: 94 BPM | WEIGHT: 169 LBS | BODY MASS INDEX: 28.16 KG/M2 | HEIGHT: 65 IN | DIASTOLIC BLOOD PRESSURE: 73 MMHG | TEMPERATURE: 97 F | SYSTOLIC BLOOD PRESSURE: 118 MMHG | RESPIRATION RATE: 18 BRPM

## 2024-09-13 DIAGNOSIS — M22.42 CHONDROMALACIA PATELLAE, LEFT KNEE: Primary | ICD-10-CM

## 2024-09-13 PROCEDURE — 7100000010 HC PHASE TWO TIME - EACH INCREMENTAL 1 MINUTE: Performed by: STUDENT IN AN ORGANIZED HEALTH CARE EDUCATION/TRAINING PROGRAM

## 2024-09-13 PROCEDURE — 3600000009 HC OR TIME - EACH INCREMENTAL 1 MINUTE - PROCEDURE LEVEL FOUR: Performed by: STUDENT IN AN ORGANIZED HEALTH CARE EDUCATION/TRAINING PROGRAM

## 2024-09-13 PROCEDURE — 2500000004 HC RX 250 GENERAL PHARMACY W/ HCPCS (ALT 636 FOR OP/ED): Performed by: ANESTHESIOLOGY

## 2024-09-13 PROCEDURE — 3700000001 HC GENERAL ANESTHESIA TIME - INITIAL BASE CHARGE: Performed by: STUDENT IN AN ORGANIZED HEALTH CARE EDUCATION/TRAINING PROGRAM

## 2024-09-13 PROCEDURE — 7100000002 HC RECOVERY ROOM TIME - EACH INCREMENTAL 1 MINUTE: Performed by: STUDENT IN AN ORGANIZED HEALTH CARE EDUCATION/TRAINING PROGRAM

## 2024-09-13 PROCEDURE — 2500000004 HC RX 250 GENERAL PHARMACY W/ HCPCS (ALT 636 FOR OP/ED): Mod: JZ | Performed by: STUDENT IN AN ORGANIZED HEALTH CARE EDUCATION/TRAINING PROGRAM

## 2024-09-13 PROCEDURE — 29877 ARTHRS KNEE SURG DBRDMT/SHVG: CPT | Performed by: STUDENT IN AN ORGANIZED HEALTH CARE EDUCATION/TRAINING PROGRAM

## 2024-09-13 PROCEDURE — 3700000002 HC GENERAL ANESTHESIA TIME - EACH INCREMENTAL 1 MINUTE: Performed by: STUDENT IN AN ORGANIZED HEALTH CARE EDUCATION/TRAINING PROGRAM

## 2024-09-13 PROCEDURE — 2720000007 HC OR 272 NO HCPCS: Performed by: STUDENT IN AN ORGANIZED HEALTH CARE EDUCATION/TRAINING PROGRAM

## 2024-09-13 PROCEDURE — 7100000009 HC PHASE TWO TIME - INITIAL BASE CHARGE: Performed by: STUDENT IN AN ORGANIZED HEALTH CARE EDUCATION/TRAINING PROGRAM

## 2024-09-13 PROCEDURE — 2500000004 HC RX 250 GENERAL PHARMACY W/ HCPCS (ALT 636 FOR OP/ED): Performed by: NURSE ANESTHETIST, CERTIFIED REGISTERED

## 2024-09-13 PROCEDURE — 2500000005 HC RX 250 GENERAL PHARMACY W/O HCPCS: Performed by: NURSE ANESTHETIST, CERTIFIED REGISTERED

## 2024-09-13 PROCEDURE — 3600000004 HC OR TIME - INITIAL BASE CHARGE - PROCEDURE LEVEL FOUR: Performed by: STUDENT IN AN ORGANIZED HEALTH CARE EDUCATION/TRAINING PROGRAM

## 2024-09-13 PROCEDURE — 7100000001 HC RECOVERY ROOM TIME - INITIAL BASE CHARGE: Performed by: STUDENT IN AN ORGANIZED HEALTH CARE EDUCATION/TRAINING PROGRAM

## 2024-09-13 PROCEDURE — 2500000001 HC RX 250 WO HCPCS SELF ADMINISTERED DRUGS (ALT 637 FOR MEDICARE OP): Performed by: ANESTHESIOLOGY

## 2024-09-13 RX ORDER — SODIUM CHLORIDE, SODIUM LACTATE, POTASSIUM CHLORIDE, CALCIUM CHLORIDE 600; 310; 30; 20 MG/100ML; MG/100ML; MG/100ML; MG/100ML
20 INJECTION, SOLUTION INTRAVENOUS CONTINUOUS
Status: CANCELLED | OUTPATIENT
Start: 2024-09-13

## 2024-09-13 RX ORDER — CEFAZOLIN SODIUM 2 G/100ML
2 INJECTION, SOLUTION INTRAVENOUS ONCE
Status: CANCELLED | OUTPATIENT
Start: 2024-09-13 | End: 2024-09-13

## 2024-09-13 RX ORDER — FENTANYL CITRATE 50 UG/ML
INJECTION, SOLUTION INTRAMUSCULAR; INTRAVENOUS AS NEEDED
Status: DISCONTINUED | OUTPATIENT
Start: 2024-09-13 | End: 2024-09-13

## 2024-09-13 RX ORDER — HYDROMORPHONE HYDROCHLORIDE 1 MG/ML
1 INJECTION, SOLUTION INTRAMUSCULAR; INTRAVENOUS; SUBCUTANEOUS EVERY 5 MIN PRN
Status: DISCONTINUED | OUTPATIENT
Start: 2024-09-13 | End: 2024-09-13 | Stop reason: HOSPADM

## 2024-09-13 RX ORDER — BUPIVACAINE HYDROCHLORIDE 5 MG/ML
INJECTION, SOLUTION EPIDURAL; INTRACAUDAL AS NEEDED
Status: DISCONTINUED | OUTPATIENT
Start: 2024-09-13 | End: 2024-09-13 | Stop reason: HOSPADM

## 2024-09-13 RX ORDER — OXYCODONE AND ACETAMINOPHEN 5; 325 MG/1; MG/1
1 TABLET ORAL EVERY 4 HOURS PRN
Status: DISCONTINUED | OUTPATIENT
Start: 2024-09-13 | End: 2024-09-13 | Stop reason: HOSPADM

## 2024-09-13 RX ORDER — ALBUTEROL SULFATE 0.83 MG/ML
2.5 SOLUTION RESPIRATORY (INHALATION) ONCE AS NEEDED
Status: DISCONTINUED | OUTPATIENT
Start: 2024-09-13 | End: 2024-09-13 | Stop reason: HOSPADM

## 2024-09-13 RX ORDER — PROPOFOL 10 MG/ML
INJECTION, EMULSION INTRAVENOUS AS NEEDED
Status: DISCONTINUED | OUTPATIENT
Start: 2024-09-13 | End: 2024-09-13

## 2024-09-13 RX ORDER — CEFAZOLIN SODIUM 2 G/100ML
INJECTION, SOLUTION INTRAVENOUS AS NEEDED
Status: DISCONTINUED | OUTPATIENT
Start: 2024-09-13 | End: 2024-09-13

## 2024-09-13 RX ORDER — SODIUM CHLORIDE, SODIUM LACTATE, POTASSIUM CHLORIDE, CALCIUM CHLORIDE 600; 310; 30; 20 MG/100ML; MG/100ML; MG/100ML; MG/100ML
100 INJECTION, SOLUTION INTRAVENOUS CONTINUOUS
Status: DISCONTINUED | OUTPATIENT
Start: 2024-09-13 | End: 2024-09-13 | Stop reason: HOSPADM

## 2024-09-13 RX ORDER — ONDANSETRON HYDROCHLORIDE 2 MG/ML
4 INJECTION, SOLUTION INTRAVENOUS ONCE AS NEEDED
Status: COMPLETED | OUTPATIENT
Start: 2024-09-13 | End: 2024-09-13

## 2024-09-13 RX ORDER — LIDOCAINE HYDROCHLORIDE 20 MG/ML
INJECTION, SOLUTION INFILTRATION; PERINEURAL AS NEEDED
Status: DISCONTINUED | OUTPATIENT
Start: 2024-09-13 | End: 2024-09-13

## 2024-09-13 RX ORDER — ACETAMINOPHEN 325 MG/1
975 TABLET ORAL ONCE
Status: DISCONTINUED | OUTPATIENT
Start: 2024-09-13 | End: 2024-09-13 | Stop reason: HOSPADM

## 2024-09-13 RX ORDER — OXYCODONE HYDROCHLORIDE 10 MG/1
10 TABLET ORAL EVERY 4 HOURS PRN
Status: DISCONTINUED | OUTPATIENT
Start: 2024-09-13 | End: 2024-09-13 | Stop reason: HOSPADM

## 2024-09-13 RX ORDER — ONDANSETRON HYDROCHLORIDE 2 MG/ML
INJECTION, SOLUTION INTRAVENOUS AS NEEDED
Status: DISCONTINUED | OUTPATIENT
Start: 2024-09-13 | End: 2024-09-13

## 2024-09-13 RX ORDER — MIDAZOLAM HYDROCHLORIDE 1 MG/ML
1 INJECTION, SOLUTION INTRAMUSCULAR; INTRAVENOUS ONCE AS NEEDED
Status: DISCONTINUED | OUTPATIENT
Start: 2024-09-13 | End: 2024-09-13 | Stop reason: HOSPADM

## 2024-09-13 RX ORDER — HYDRALAZINE HYDROCHLORIDE 20 MG/ML
5 INJECTION INTRAMUSCULAR; INTRAVENOUS EVERY 30 MIN PRN
Status: DISCONTINUED | OUTPATIENT
Start: 2024-09-13 | End: 2024-09-13 | Stop reason: HOSPADM

## 2024-09-13 RX ORDER — IBUPROFEN 800 MG/1
400 TABLET ORAL EVERY 6 HOURS PRN
Status: DISCONTINUED | OUTPATIENT
Start: 2024-09-13 | End: 2024-09-13 | Stop reason: HOSPADM

## 2024-09-13 RX ORDER — HYDROMORPHONE HYDROCHLORIDE 0.2 MG/ML
0.1 INJECTION INTRAMUSCULAR; INTRAVENOUS; SUBCUTANEOUS EVERY 5 MIN PRN
Status: DISCONTINUED | OUTPATIENT
Start: 2024-09-13 | End: 2024-09-13 | Stop reason: HOSPADM

## 2024-09-13 RX ORDER — PHENYLEPHRINE HYDROCHLORIDE 10 MG/ML
INJECTION INTRAVENOUS AS NEEDED
Status: DISCONTINUED | OUTPATIENT
Start: 2024-09-13 | End: 2024-09-13

## 2024-09-13 SDOH — HEALTH STABILITY: MENTAL HEALTH: CURRENT SMOKER: 0

## 2024-09-13 ASSESSMENT — PAIN DESCRIPTION - ORIENTATION
ORIENTATION: LEFT

## 2024-09-13 ASSESSMENT — PAIN SCALES - GENERAL
PAINLEVEL_OUTOF10: 7
PAINLEVEL_OUTOF10: 3
PAINLEVEL_OUTOF10: 5 - MODERATE PAIN
PAINLEVEL_OUTOF10: 2
PAIN_LEVEL: 2
PAINLEVEL_OUTOF10: 8
PAINLEVEL_OUTOF10: 8
PAINLEVEL_OUTOF10: 5 - MODERATE PAIN
PAINLEVEL_OUTOF10: 5 - MODERATE PAIN
PAINLEVEL_OUTOF10: 0 - NO PAIN
PAINLEVEL_OUTOF10: 3
PAINLEVEL_OUTOF10: 8

## 2024-09-13 ASSESSMENT — PAIN - FUNCTIONAL ASSESSMENT
PAIN_FUNCTIONAL_ASSESSMENT: 0-10

## 2024-09-13 ASSESSMENT — PAIN DESCRIPTION - DESCRIPTORS
DESCRIPTORS: DISCOMFORT
DESCRIPTORS: DULL
DESCRIPTORS: DISCOMFORT

## 2024-09-13 ASSESSMENT — COLUMBIA-SUICIDE SEVERITY RATING SCALE - C-SSRS
1. IN THE PAST MONTH, HAVE YOU WISHED YOU WERE DEAD OR WISHED YOU COULD GO TO SLEEP AND NOT WAKE UP?: NO
2. HAVE YOU ACTUALLY HAD ANY THOUGHTS OF KILLING YOURSELF?: NO
6. HAVE YOU EVER DONE ANYTHING, STARTED TO DO ANYTHING, OR PREPARED TO DO ANYTHING TO END YOUR LIFE?: NO

## 2024-09-13 ASSESSMENT — PAIN DESCRIPTION - LOCATION
LOCATION: KNEE

## 2024-09-13 NOTE — ANESTHESIA PREPROCEDURE EVALUATION
Patient: Selma Delacruz    Procedure Information       Anesthesia Start Date/Time: 09/13/24 0822    Procedure: Arthroscopy Left Knee Patellofemoral Chondroplasty (Left: Knee)    Location: [facility] OR 04 / [facility] OR    Surgeons: Chandler Aguilar MD            Relevant Problems   Neuro   (+) Anxiety   (+) Posttraumatic stress disorder      Musculoskeletal   (+) Chondromalacia patellae, left knee       Clinical information reviewed:   Tobacco  Allergies  Meds  Problems  Med Hx  Surg Hx   Fam Hx  Soc   Hx        NPO Detail:  NPO/Void Status  Carbohydrate Drink Given Prior to Surgery? : N  Date of Last Liquid: 09/12/24  Time of Last Liquid: 2200  Date of Last Solid: 09/12/24  Time of Last Solid: 2200  Last Intake Type: Food  Time of Last Void: 0600         Physical Exam    Airway  Mallampati: II  TM distance: >3 FB  Neck ROM: full     Cardiovascular - normal exam  Rhythm: regular  Rate: normal     Dental - normal exam     Pulmonary - normal exam  Breath sounds clear to auscultation  (+) decreased breath sounds     Abdominal   (+) obese  Abdomen: soft  Bowel sounds: normal           Anesthesia Plan    History of general anesthesia?: yes  History of complications of general anesthesia?: no    ASA 2     general     The patient is not a current smoker.  Patient was previously instructed to abstain from smoking on day of procedure.  Patient did not smoke on day of procedure.  Education provided regarding risk of obstructive sleep apnea.  intravenous induction   Postoperative administration of opioids is intended.  Anesthetic plan and risks discussed with patient.  Use of blood products discussed with patient who consented to blood products.    Plan discussed with CRNA.

## 2024-09-13 NOTE — DISCHARGE INSTRUCTIONS
POST-OPERATIVE INSTRUCTIONS: KNEE ARTHROSCOPY   Dr. Chandler Aguilar III, MD    ACTIVITY  ·Crutches may help you balance for the first few days; however, you may put as much weight as comfortable on your leg.    ·You may bend and straighten your knee as much as you like.  ·Do not engage in prolonged periods of standing or walking over the first 7-10 days following surgery.  ·Avoid long periods of sitting (without leg elevated) or long distance traveling for 2 weeks.    DRESSINGS & INCISIONS  ·The first two days after surgery you can expect a small amount of red-tinged drainage on your dressings.  This is normal.   ·Please keep the dressing clean and dry; if you are going to shower/bathe, you must protect the dressing.  You may not soak in a pool, lake, hot tub, or the ocean until 1 week after the sutures have been removed.  ·You may remove the dressing 4 days after surgery (white cotton wrap, white gauze pads, yellow gauze tape).   ·You may apply Band AidsÒ to the incisions or leave them open to air.  ·Please do not use BacitracinÒ or other ointments on the incisions.    PAIN & INFLAMMATION  ·Ice - Apply an ice bag wrapped in a dry towel several times per day for 20 minutes for the first week and then as needed for pain relief and inflammation.   ·Compression - Use your ACE wrap to decrease swelling. Always wrap from your foot towards your thigh.  ·Elevation - Keep your foot elevated above your heart as much as possible for the first 3 to 4 days.  Keep your leg elevated with a pillow under your calf or foot, NOT under the knee.  ·Pain Medication  You have been given a prescription for pain control; please take as directed.  If you think you will require a refill on your medication, you MUST do so during our regular weekday office hours.  If you need additional pain medication you may take Tylenol 500-650mg every 4-6 hours. Do not take more than 3grams or 3000mg in a 24 hour period!  Common side effects of the pain  medication are:  NAUSEA: To decrease nausea, take these medications with food.  DROWSINESS: Do not drive a car or operate machinery.  ITCHING: You may take Benadryl to alleviate any itching.  CONSTIPATION: To decrease constipation, use the stool softener provided (Docusate 250mg) or over-the-counter remedies (Milk of Magnesia, Miralax, etc).  Also avoid bananas, rice, apples, toast, or yogurt…as these foods can make you constipated.  Getting up and moving around also helps with constipation by “waking up” your intestinal tract.  Anti-inflammatory medications (Aleve, Ibuprofen, Naproxen, etc.) may be taken to help with swelling and pain.    EMERGENCIES  ·Please have someone stay with you for the first 24 hours after surgery  ·Please call the clinic or the orthopaedist on-call if:  Drainage soaks the dressings, expands, is foul-smelling, or your incisions are red, warm, and extremely painful  You develop a fever (>101.5°) or chills  You experience leg or calf pain, leg swelling, or difficulty breathing    FOLLOW-UP CARE  ·Please schedule a follow-up visit for suture removal, and to review your surgery 10-14 days postoperatively.       IF YOU HAVE ANY QUESTIONS OR CONCERNS, PLEASE FEEL FREE TO CALL THE OFFICE ([phone]).    EXERCISES - When you are comfortable and ready you may perform each exercise 2-3 times a day; it may help to take pain medication 20-30 minutes prior to the exercises and to apply ice after the exercises      ·Flexion:   Sit in a chair  Place your unoperated leg (B) under the foot of your operated leg (A)  Gently allow the knee to bend with support from your unoperated leg (B)  When you reach your maximum bend, hold for 5 seconds    Perform 10-20 times in a row  Goal = 90° of flexion (bending) by 2 weeks after surgery A  B    B  A         ·Quadriceps Contractions:    Sit or lie on the floor with your operated leg straight  Place a towel roll under the knee  Tighten your thigh and hamstring  muscles, causing you to press your knee downward into the towel roll  Hold this position for 10 seconds  Relax your thigh and hamstring muscles  Perform 2-3 sets of 10        ·Straight Leg Raises:    Lie on the floor   Perform a quadriceps contraction (as stated in the above exercise)  Raise your foot about 6-12” off the floor  Slowly lower your leg back to the floor  Relax your thigh muscle  Perform 2-3 sets of 10         ·Ankle Pumps:    Point toes downward and hold for 5 seconds  Point toes upward and hold for 5 seconds  Perform 2-3 sets of 10

## 2024-09-13 NOTE — OP NOTE
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  ASA: ASA status not filed in the log.  Anesthesia Staff: Anesthesiologist: Kaur Harris MD  CRNA: HENNY Rios-CRNA  Estimated Blood Loss: 10mL  Intra-op Medications:   Administrations occurring from 0830 to 1000 on 24:   Medication Name Total Dose   bupivacaine PF (Marcaine) 0.5 % (5 mg/mL) injection 10 mL              Anesthesia Record               Intraprocedure I/O Totals          Intake    LR bolus 1000.00 mL    Total Intake 1000 mL       Output    Urine 0 mL    Est. Blood Loss 5 mL    Total Output 5 mL       Net    Net Volume 995 mL          Specimen: No specimens collected     Staff:   Circulator: Renee Montez Person: Feng Roblesub Person: Payton           Drains and/or Catheters: * None in log *    Tourniquet Times:   * Missing tourniquet times found for documented tourniquets in lo *     Implants:     Findings: see procedure details    Indications: The patient is a 50yo F with a history of knee pain which has been unresponsive to conservative management. They were seen in clinic. An MRI was obtained which revealed patellofemoral chondromalacia, arthritis about the left knee. We discussed continuing nonoperative management versus operative management. The patient elected to proceed with operative  management. For detailed discussion of risks, benefits, and alternatives, please see the orthopedic clinic notes.    We reviewed today the usual risks of arthroscopy, including bleeding, damage to neurovascular structures, postoperative stiffness, persistent pain, degenerative joint changes which may be progressive and require further treatment, wound healing complications, infection and development of a new or exacerbation of an existing medical comorbidity. We reviewed specifically the signs and symptoms of venous thromboembolic disease.     DESCRIPTION OF PROCEDURE:       On the date of surgery, the patient was identified in the preoperative holding area.  Surgical site was agreed upon, confirmed, and marked by the surgery team, nursing staff and the patient themself.  I marked the operative side. They were taken to the operating room and a surgical time-out was performed. They were positioned supine on the operating table with attention paid to padding all bony prominences. An anesthetic was administered by anesthesia staff. The limb was prepped and draped in the usual sterile fashion after a tourniquet was applied over soft padding. They received antibiotic prophylaxis within 30 minutes of incision and mechanical DVT prophylaxis to the nonoperative leg.  Attention was first turned to the diagnostic portion of the procedure.    Examination under anesthesia was performed which revealed stable exam to anterior and posterior drawer, Lachman, pivot shift and varus and valgus stress. There was full range of motion.    Diagnostic arthroscopy was then undertaken. The tourniquet was inflated and portal sites were marked utilizing anatomic landmarks.  A lateral viewing portal was established and then a medial working portal was established under direct visualization.  A probe was introduced and all structures were thoroughly probed and evaluated for pathology. Results of the diagnostic arthroscopy are as follows:       Suprapatellar pouch synovitis  Patella grade 4 Outerbridge change about the lateral facet of the trochlea  Trochlea grade 3 Outerbridge change  Medial femoral condyle normal  Medial tibial plateau normal  Lateral femoral condyle grade 2 Outerbridge change  Lateral tibial plateau normal  Medial meniscus normal  Lateral meniscus normal   Medial gutter normal  Lateral gutter normal  Notch synovitis  ACL normal   PCL normal  Posterior knee no loose bodies    Attention was then turned to the therapeutic portion of the arthroscopic procedure.      Given grade 3/4 Outerbridge change about the patellofemoral space we did proceed with gentle chondroplasty removing any loose cartilage edges.  Utilizing the mechanized shaver a chondroplasty was performed.  A probe was subsequently utilized to ensure no remaining loose floating edges         Attention was turned to the inflamed/hypertrophic synovium of the notch, the anterior-medial and anterior- lateral compartments, and the suprapatellar pouch, and this was thoroughly debrided with a shaver.     The knee was copiously lavaged.  The arthroscope was removed.  The portals were closed with 3-0 inverted figure-of-eight sutures.  Xeroform and a sterile dressing were placed.  The tourniquet was deflated after application of an Ace wrap for compression.  The patient was awakened from anesthesia and taken to recovery room in good condition.      POSTOPERATIVE PLAN:   Date of discharge protocol with narcotics.    Early ambulation and mechanical compression for DVT prevention.  Follow up in clinic in 2 weeks for incision check and to review arthroscopic findings.  Weight bear as tolerated. Crutches for ambulation assistance.         Complications:  None; patient tolerated the procedure well.    Disposition: PACU - hemodynamically stable.  Condition: stable     Sebastian Salcido PA-C was present for the entire case.  Given the nature of the procedure and disease process a skilled surgical  assistant was necessary for the case.  The assistant was necessary for retraction and helped directly facilitate completion of the surgery.  A certified scrub tech was at the back table managing instruments and supplies for the surgical procedure.    Summary of findings grade 4 Outerbridge change with loose cartilage flap patellofemoral space, grade 2/3 Outerbridge change lateral tibial plateau    Chandler Aguilar  Phone Number: [phone]

## 2024-09-13 NOTE — ANESTHESIA POSTPROCEDURE EVALUATION
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    Anesthesia Post Evaluation    Patient location during evaluation: PACU  Patient participation: complete - patient participated  Level of consciousness: sleepy but conscious, responsive to verbal stimuli, responsive to light touch and responsive to physical stimuli  Pain score: 2  Pain management: satisfactory to patient  Multimodal analgesia pain management approach  Airway patency: patent  Two or more strategies used to mitigate risk of obstructive sleep apnea  Cardiovascular status: acceptable, hemodynamically stable and stable  Respiratory status: acceptable, unassisted, spontaneous ventilation and nonlabored ventilation  Hydration status: balanced  Postoperative Nausea and Vomiting: none        No notable events documented.

## 2024-09-13 NOTE — ANESTHESIA PROCEDURE NOTES
Airway  Date/Time: 9/13/2024 8:30 AM  Urgency: elective    Airway not difficult    Staffing  Performed: resident   Authorized by: Kaur Harris MD    Performed by: ISABELLE Rios  Patient location during procedure: OR    Indications and Patient Condition  Indications for airway management: anesthesia  Spontaneous ventilation: present  Sedation level: deep  Preoxygenated: yes  Patient position: sniffing  MILS maintained throughout  Mask difficulty assessment: 1 - vent by mask  No planned trial extubation    Final Airway Details  Final airway type: supraglottic airway      Successful airway: unique  Size 4     Number of attempts at approach: 1  Ventilation between attempts: BVM  Number of other approaches attempted: 0

## 2024-09-26 DIAGNOSIS — M22.42 CHONDROMALACIA OF LEFT PATELLOFEMORAL JOINT: ICD-10-CM

## 2024-09-27 ENCOUNTER — APPOINTMENT (OUTPATIENT)
Dept: ORTHOPEDIC SURGERY | Facility: CLINIC | Age: 49
End: 2024-09-27
Payer: OTHER GOVERNMENT

## 2024-09-27 DIAGNOSIS — Z98.890 STATUS POST ARTHROSCOPY OF LEFT KNEE: Primary | ICD-10-CM

## 2024-09-27 RX ORDER — NAPROXEN 500 MG/1
500 TABLET ORAL 2 TIMES DAILY
Qty: 60 TABLET | Refills: 0 | Status: SHIPPED | OUTPATIENT
Start: 2024-09-27

## 2024-09-27 NOTE — PROGRESS NOTES
Chief Complaint   Patient presents with    Left Knee - Post-op     Patellofemoral chondroplasty  DOS: 9/13/24 (15 days out)         History of Present Illness  Selma is a 49-year-old female presenting today for postop evaluation following left knee patellofemoral chondroplasty 9/13/2024.  Patient returns today noting minimal pain.  Still endorsing some swelling and stiffness however this has been improving.  Denies any calf pain or shortness of breath.       Exam  Mild effusion  Healthy incisions - no active drainage  Good range of motion 0-100  No calf swelling  Negative Brendan´s test  Distal neurovascular exam intact     Assessment  49-year-old female status post left knee patellofemoral chondroplasty, 2 weeks out     Plan  Reviewed arthroscopic photos and findings with patient in detail today.  Sutures removed today, Steri-Strips applied.  Discussed short and long term implications for the knee.  Discussed the role of physical therapy, bracing, anti-inflammatories, intra-articular cortisone injections, gel injections, ultimately total knee arthroplasty as well as risk benefits and alternatives to each  Discussed analgesics, ice, rest.  Encouraged home exercise program, physical therapy.   Follow-up: 4 weeks  XRays at follow up: none    Discussed with patient arthroscopic findings of suprapatellar pouch synovitis, grade 4 Outerbridge changes about the patella, grade 3 Outerbridge change about the trochlea, grade 2 Outerbridge change about the lateral femoral condyle    Sebastian Salcido PA-C

## 2024-10-02 DIAGNOSIS — F41.9 ANXIETY: ICD-10-CM

## 2024-10-02 DIAGNOSIS — E66.811 OBESITY (BMI 30.0-34.9): Primary | ICD-10-CM

## 2024-10-02 RX ORDER — BUPROPION HYDROCHLORIDE 300 MG/1
300 TABLET ORAL DAILY
Qty: 90 TABLET | Refills: 3 | Status: SHIPPED | OUTPATIENT
Start: 2024-10-02

## 2024-10-14 ENCOUNTER — OFFICE VISIT (OUTPATIENT)
Dept: ORTHOPEDIC SURGERY | Facility: CLINIC | Age: 49
End: 2024-10-14
Payer: OTHER GOVERNMENT

## 2024-10-14 VITALS — HEIGHT: 65 IN | BODY MASS INDEX: 27.32 KG/M2 | WEIGHT: 164 LBS

## 2024-10-14 DIAGNOSIS — Z98.890 STATUS POST ARTHROSCOPY OF LEFT KNEE: Primary | ICD-10-CM

## 2024-10-14 PROCEDURE — 3008F BODY MASS INDEX DOCD: CPT

## 2024-10-14 PROCEDURE — 99211 OFF/OP EST MAY X REQ PHY/QHP: CPT

## 2024-10-14 PROCEDURE — 99024 POSTOP FOLLOW-UP VISIT: CPT

## 2024-10-14 PROCEDURE — 1036F TOBACCO NON-USER: CPT

## 2024-10-14 NOTE — PROGRESS NOTES
Chief Complaint   Patient presents with    Left Knee - Follow-up     Follow up s/p left knee patellofemoral chondroplasty done 9/13/2024,31 days out       History of Present Illness  Selma is a 49-year-old female presenting today for follow-up evaluation after left knee patellofemoral chondroplasty 9/13/2024.  Patient returns today noting moderate pain and improving activity level. Denies any calf pain or shortness of breath.  Overall states she does feel she has made some improvements however still having a good amount of pain with certain activities specifically stairs.  Still endorsing mechanical symptoms of popping/clicking.  This has somewhat resolved since surgery however is still present.  Presents today requesting cortisone injection.       Exam  Trace effusion  Well healed incisions   Full range of motion   Negative Brendan´s test  Distal neurovascular exam intact     Assessment  Patient status post left knee arthroscopy patellofemoral chondroplasty, 1 month out     Plan  Discussed return to activity and home exercise program.  Encouraged home exercise program, physical therapy exercises.    Discussed with patient that I would like to forego cortisone injection at this time to fully see postoperative course following patellofemoral chondroplasty.  Discussed with her that as she has had some relief of symptoms I would like to proceed with expectant postoperative management to fully see what relief she gets.  Discussed with patient that if she continues to have pain and symptoms at the 3-month postoperative mariah we can proceed with an intra-articular cortisone injection.  Patient does express understanding and agrees with this plan.   Patient also inquiring about gel injection therapy.  Discussed in detail this modality.  Patient understands that this is an option for her in her future however I to proceed with expectant postoperative management at this time followed by cortisone injection if she continues  to have symptoms.  Patient will follow-up in 6 to 8 weeks  XRays at follow up: none    Sebastian Salcido PA-C

## 2024-10-21 ENCOUNTER — APPOINTMENT (OUTPATIENT)
Dept: PLASTIC SURGERY | Facility: CLINIC | Age: 49
End: 2024-10-21
Payer: OTHER GOVERNMENT

## 2024-10-29 DIAGNOSIS — F32.A DEPRESSIVE DISORDER: ICD-10-CM

## 2024-10-29 DIAGNOSIS — E66.811 OBESITY (BMI 30.0-34.9): ICD-10-CM

## 2024-10-29 RX ORDER — TIRZEPATIDE 10 MG/.5ML
10 INJECTION, SOLUTION SUBCUTANEOUS
Qty: 6 ML | Refills: 3 | Status: SHIPPED | OUTPATIENT
Start: 2024-10-29

## 2024-10-29 RX ORDER — FLUOXETINE HYDROCHLORIDE 20 MG/1
20 CAPSULE ORAL DAILY
Qty: 90 CAPSULE | Refills: 3 | Status: SHIPPED | OUTPATIENT
Start: 2024-10-29 | End: 2025-10-29

## 2024-10-30 DIAGNOSIS — M22.42 CHONDROMALACIA OF LEFT PATELLOFEMORAL JOINT: ICD-10-CM

## 2024-10-30 RX ORDER — NAPROXEN 500 MG/1
500 TABLET ORAL
Qty: 60 TABLET | Refills: 2 | Status: SHIPPED | OUTPATIENT
Start: 2024-10-30

## 2024-11-01 DIAGNOSIS — M22.42 CHONDROMALACIA OF LEFT PATELLOFEMORAL JOINT: ICD-10-CM

## 2024-11-01 RX ORDER — NAPROXEN 500 MG/1
500 TABLET ORAL 2 TIMES DAILY
Qty: 60 TABLET | Refills: 2 | OUTPATIENT
Start: 2024-11-01

## 2024-11-04 ENCOUNTER — APPOINTMENT (OUTPATIENT)
Dept: PRIMARY CARE | Facility: CLINIC | Age: 49
End: 2024-11-04
Payer: OTHER GOVERNMENT

## 2024-11-04 ENCOUNTER — APPOINTMENT (OUTPATIENT)
Dept: ORTHOPEDIC SURGERY | Facility: CLINIC | Age: 49
End: 2024-11-04
Payer: OTHER GOVERNMENT

## 2024-11-04 VITALS
RESPIRATION RATE: 16 BRPM | WEIGHT: 162 LBS | OXYGEN SATURATION: 100 % | TEMPERATURE: 98 F | HEIGHT: 65 IN | HEART RATE: 80 BPM | SYSTOLIC BLOOD PRESSURE: 102 MMHG | BODY MASS INDEX: 26.99 KG/M2 | DIASTOLIC BLOOD PRESSURE: 70 MMHG

## 2024-11-04 DIAGNOSIS — Z79.899 HIGH RISK MEDICATION USE: ICD-10-CM

## 2024-11-04 DIAGNOSIS — Z12.31 ENCOUNTER FOR SCREENING MAMMOGRAM FOR MALIGNANT NEOPLASM OF BREAST: ICD-10-CM

## 2024-11-04 DIAGNOSIS — Z00.00 HEALTH CARE MAINTENANCE: Primary | ICD-10-CM

## 2024-11-04 DIAGNOSIS — F41.9 ANXIETY: ICD-10-CM

## 2024-11-04 DIAGNOSIS — Z00.00 HEALTH MAINTENANCE EXAMINATION: ICD-10-CM

## 2024-11-04 PROCEDURE — 3008F BODY MASS INDEX DOCD: CPT | Performed by: INTERNAL MEDICINE

## 2024-11-04 PROCEDURE — 80354 DRUG SCREENING FENTANYL: CPT

## 2024-11-04 PROCEDURE — 1036F TOBACCO NON-USER: CPT | Performed by: INTERNAL MEDICINE

## 2024-11-04 PROCEDURE — 80373 DRUG SCREENING TRAMADOL: CPT

## 2024-11-04 PROCEDURE — 99396 PREV VISIT EST AGE 40-64: CPT | Performed by: INTERNAL MEDICINE

## 2024-11-04 PROCEDURE — 80307 DRUG TEST PRSMV CHEM ANLYZR: CPT

## 2024-11-04 PROCEDURE — 80358 DRUG SCREENING METHADONE: CPT

## 2024-11-04 PROCEDURE — 80346 BENZODIAZEPINES1-12: CPT

## 2024-11-04 PROCEDURE — 82570 ASSAY OF URINE CREATININE: CPT

## 2024-11-04 PROCEDURE — 80365 DRUG SCREENING OXYCODONE: CPT

## 2024-11-04 PROCEDURE — 90750 HZV VACC RECOMBINANT IM: CPT | Performed by: INTERNAL MEDICINE

## 2024-11-04 PROCEDURE — 90471 IMMUNIZATION ADMIN: CPT | Performed by: INTERNAL MEDICINE

## 2024-11-04 PROCEDURE — 80361 OPIATES 1 OR MORE: CPT

## 2024-11-04 PROCEDURE — 80368 SEDATIVE HYPNOTICS: CPT

## 2024-11-04 RX ORDER — ALPRAZOLAM 0.25 MG/1
0.25 TABLET ORAL EVERY 8 HOURS PRN
Qty: 30 TABLET | Refills: 0 | Status: SHIPPED | OUTPATIENT
Start: 2024-11-04 | End: 2024-12-04

## 2024-11-04 ASSESSMENT — ENCOUNTER SYMPTOMS
BACK PAIN: 0
CONSTIPATION: 0
SORE THROAT: 0
WEAKNESS: 0
ARTHRALGIAS: 0
EYE ITCHING: 0
FREQUENCY: 0
WHEEZING: 0
NAUSEA: 0
FEVER: 0
UNEXPECTED WEIGHT CHANGE: 0
DIFFICULTY URINATING: 0
DIARRHEA: 0
DYSURIA: 0
EYE PAIN: 0
RHINORRHEA: 0
PALPITATIONS: 0
ABDOMINAL PAIN: 0
PSYCHIATRIC NEGATIVE: 1
SHORTNESS OF BREATH: 0
FATIGUE: 0
HEADACHES: 0
EYE REDNESS: 0
COUGH: 0

## 2024-11-04 NOTE — PROGRESS NOTES
Subjective   Selma Delacruz is a 49 y.o. female who presents for Annual Exam.    HPI   Influenza declines  Covid 2021  Shingrix 24  Tdap 2019  Mammogram 11/2023  Pap Hysterectomy  Colonoscopy 2024  Bmi 26  Depression screen 24      OARRS:  Lisa Hedrick, DO on 11/4/2024  8:09 AM  I have personally reviewed the OARRS report for Selma Delacruz. I have considered the risks of abuse, dependence, addiction and diversion    Is the patient prescribed a combination of a benzodiazepine and opioid?  No    Last Urine Drug Screen / ordered today: Yes  No results found for this or any previous visit (from the past 8760 hours).  Results are as expected.     Clinical rationale for not completing a Urine Drug Screen: today      Controlled Substance Agreement:  Date of the Last Agreement: 11/4/24  Reviewed Controlled Substance Agreement including but not limited to the benefits, risks, and alternatives to treatment with a Controlled Substance medication(s).    Benzodiazepines:  What is the patient's goal of therapy? Anxiety relief  Is this being achieved with current treatment? Yes    SALVADOR-7:  No data recorded    Activities of Daily Living:   Is your overall impression that this patient is benefiting (symptom reduction outweighs side effects) from benzodiazepine therapy? Yes     1. Physical Functioning: Better  2. Family Relationship: Better  3. Social Relationship: Better  4. Mood: Better  5. Sleep Patterns: Better  6. Overall Function: Better  Review of Systems   Constitutional:  Negative for fatigue, fever and unexpected weight change.   HENT:  Negative for congestion, ear pain, rhinorrhea and sore throat.    Eyes:  Negative for pain, redness and itching.   Respiratory:  Negative for cough, shortness of breath and wheezing.    Cardiovascular:  Negative for chest pain, palpitations and leg swelling.   Gastrointestinal:  Negative for abdominal pain, constipation, diarrhea and nausea.   Genitourinary:  Negative for difficulty  "urinating, dysuria and frequency.   Musculoskeletal:  Negative for arthralgias and back pain.   Allergic/Immunologic: Negative for environmental allergies, food allergies and immunocompromised state.   Neurological:  Negative for weakness and headaches.   Psychiatric/Behavioral: Negative.     All other systems reviewed and are negative.      Health Maintenance Due   Topic Date Due    Yearly Adult Physical  Never done    HIV Screening  Never done    MMR Vaccines (1 of 1 - Standard series) Never done    Hepatitis C Screening  Never done    Diabetes Screening  Never done    Hepatitis B Vaccines (1 of 3 - 19+ 3-dose series) Never done    DTaP/Tdap/Td Vaccines (1 - Tdap) 05/04/2019    Influenza Vaccine (1) Never done    COVID-19 Vaccine (3 - 2024-25 season) 09/01/2024    Mammogram  11/20/2024       Objective   /70   Pulse 80   Temp 36.7 °C (98 °F)   Resp 16   Ht 1.651 m (5' 5\")   Wt 73.5 kg (162 lb)   SpO2 100%   BMI 26.96 kg/m²     Physical Exam  Vitals and nursing note reviewed.   Constitutional:       Appearance: Normal appearance.   HENT:      Head: Normocephalic.   Eyes:      Conjunctiva/sclera: Conjunctivae normal.      Pupils: Pupils are equal, round, and reactive to light.   Cardiovascular:      Rate and Rhythm: Normal rate and regular rhythm.      Pulses: Normal pulses.      Heart sounds: Normal heart sounds.   Pulmonary:      Effort: Pulmonary effort is normal.      Breath sounds: Normal breath sounds.   Musculoskeletal:         General: No swelling.      Cervical back: Neck supple.   Skin:     General: Skin is warm and dry.   Neurological:      General: No focal deficit present.      Mental Status: She is oriented to person, place, and time.         Assessment/Plan   Problem List Items Addressed This Visit       Anxiety    Relevant Medications    ALPRAZolam (Xanax) 0.25 mg tablet     Other Visit Diagnoses       Health care maintenance    -  Primary    Relevant Orders    CBC    Comprehensive " Metabolic Panel    Lipid Panel    Thyroid Stimulating Hormone    Vitamin B12    Vitamin D 25-Hydroxy,Total (for eval of Vitamin D levels)    Health maintenance examination        Relevant Orders    Zoster vaccine, recombinant, adult (SHINGRIX) (Completed)    Encounter for screening mammogram for malignant neoplasm of breast        Relevant Orders    BI mammo bilateral screening tomosynthesis    High risk medication use        Relevant Orders    Opiate/Opioid/Benzo Prescription Compliance    OOB Internal Tracking            I have personally reviewed patients OARRS report.  This report is scanned into the emr.  I have considered the risks of abuse, dependence, addiction and diversion.

## 2024-11-05 ENCOUNTER — LAB (OUTPATIENT)
Dept: LAB | Facility: LAB | Age: 49
End: 2024-11-05
Payer: OTHER GOVERNMENT

## 2024-11-05 DIAGNOSIS — Z00.00 HEALTH CARE MAINTENANCE: ICD-10-CM

## 2024-11-05 LAB
25(OH)D3 SERPL-MCNC: 82 NG/ML (ref 30–100)
ALBUMIN SERPL BCP-MCNC: 4.3 G/DL (ref 3.4–5)
ALP SERPL-CCNC: 67 U/L (ref 33–110)
ALT SERPL W P-5'-P-CCNC: 14 U/L (ref 7–45)
AMPHETAMINES UR QL SCN: NORMAL
ANION GAP SERPL CALC-SCNC: 10 MMOL/L (ref 10–20)
AST SERPL W P-5'-P-CCNC: 13 U/L (ref 9–39)
BARBITURATES UR QL SCN: NORMAL
BILIRUB SERPL-MCNC: 0.5 MG/DL (ref 0–1.2)
BUN SERPL-MCNC: 12 MG/DL (ref 6–23)
BZE UR QL SCN: NORMAL
CALCIUM SERPL-MCNC: 9.3 MG/DL (ref 8.6–10.3)
CANNABINOIDS UR QL SCN: NORMAL
CHLORIDE SERPL-SCNC: 105 MMOL/L (ref 98–107)
CHOLEST SERPL-MCNC: 234 MG/DL (ref 0–199)
CHOLESTEROL/HDL RATIO: 5.2
CO2 SERPL-SCNC: 28 MMOL/L (ref 21–32)
CREAT SERPL-MCNC: 0.82 MG/DL (ref 0.5–1.05)
CREAT UR-MCNC: 153.2 MG/DL (ref 20–320)
EGFRCR SERPLBLD CKD-EPI 2021: 87 ML/MIN/1.73M*2
ERYTHROCYTE [DISTWIDTH] IN BLOOD BY AUTOMATED COUNT: 12.2 % (ref 11.5–14.5)
GLUCOSE SERPL-MCNC: 89 MG/DL (ref 74–99)
HCT VFR BLD AUTO: 37.5 % (ref 36–46)
HDLC SERPL-MCNC: 44.9 MG/DL
HGB BLD-MCNC: 12.5 G/DL (ref 12–16)
LDLC SERPL CALC-MCNC: 163 MG/DL
MCH RBC QN AUTO: 31.2 PG (ref 26–34)
MCHC RBC AUTO-ENTMCNC: 33.3 G/DL (ref 32–36)
MCV RBC AUTO: 94 FL (ref 80–100)
NON HDL CHOLESTEROL: 189 MG/DL (ref 0–149)
NRBC BLD-RTO: 0 /100 WBCS (ref 0–0)
PCP UR QL SCN: NORMAL
PLATELET # BLD AUTO: 313 X10*3/UL (ref 150–450)
POTASSIUM SERPL-SCNC: 4.7 MMOL/L (ref 3.5–5.3)
PROT SERPL-MCNC: 6.4 G/DL (ref 6.4–8.2)
RBC # BLD AUTO: 4.01 X10*6/UL (ref 4–5.2)
SODIUM SERPL-SCNC: 138 MMOL/L (ref 136–145)
TRIGL SERPL-MCNC: 130 MG/DL (ref 0–149)
TSH SERPL-ACNC: 1.73 MIU/L (ref 0.44–3.98)
VIT B12 SERPL-MCNC: 336 PG/ML (ref 211–911)
VLDL: 26 MG/DL (ref 0–40)
WBC # BLD AUTO: 5.5 X10*3/UL (ref 4.4–11.3)

## 2024-11-05 PROCEDURE — 84443 ASSAY THYROID STIM HORMONE: CPT

## 2024-11-05 PROCEDURE — 82607 VITAMIN B-12: CPT

## 2024-11-05 PROCEDURE — 80061 LIPID PANEL: CPT

## 2024-11-05 PROCEDURE — 82306 VITAMIN D 25 HYDROXY: CPT

## 2024-11-05 PROCEDURE — 80053 COMPREHEN METABOLIC PANEL: CPT

## 2024-11-05 PROCEDURE — 36415 COLL VENOUS BLD VENIPUNCTURE: CPT

## 2024-11-05 PROCEDURE — 85027 COMPLETE CBC AUTOMATED: CPT

## 2024-11-07 DIAGNOSIS — N83.209 CYST OF OVARY, UNSPECIFIED LATERALITY: Primary | ICD-10-CM

## 2024-11-07 DIAGNOSIS — N95.1 MENOPAUSAL SYMPTOMS: ICD-10-CM

## 2024-11-08 ENCOUNTER — TELEPHONE (OUTPATIENT)
Dept: PRIMARY CARE | Facility: CLINIC | Age: 49
End: 2024-11-08
Payer: OTHER GOVERNMENT

## 2024-11-08 NOTE — TELEPHONE ENCOUNTER
FW: GYN  Received: Yesterday  Lisa Hedrick DO  Luli Lindquist  Phone Number: [phone]     Referral in for           Previous Messages    GYN  (Newest Message First)  View All Conversations on this Encounter  Lisa Hedrick DO  You16 hours ago (4:00 PM)       Referral in for      Qing Vega LPN routed conversation to Lisa Hedrick DOYesterday (7:39 AM)     Selma [place] [id number] Clinical Support Staff (supporting Lisa Hedrick DO)2 days ago       Do  I need a referral to a GYN? I want to talk to a GYN about my hysterectomy, an ovarian cyst that ruptured in 2018, and urinary incontinence. I meant to talk to Dr. Hedrick about this on Monday.      Thanks!

## 2024-11-15 ENCOUNTER — HOSPITAL ENCOUNTER (OUTPATIENT)
Dept: RADIOLOGY | Facility: CLINIC | Age: 49
Discharge: HOME | End: 2024-11-15
Payer: OTHER GOVERNMENT

## 2024-11-15 VITALS — WEIGHT: 162 LBS | HEIGHT: 65 IN | BODY MASS INDEX: 26.99 KG/M2

## 2024-11-15 DIAGNOSIS — Z12.31 ENCOUNTER FOR SCREENING MAMMOGRAM FOR MALIGNANT NEOPLASM OF BREAST: ICD-10-CM

## 2024-11-15 PROCEDURE — 77067 SCR MAMMO BI INCL CAD: CPT

## 2024-11-15 PROCEDURE — 77067 SCR MAMMO BI INCL CAD: CPT | Performed by: RADIOLOGY

## 2024-11-15 PROCEDURE — 77063 BREAST TOMOSYNTHESIS BI: CPT | Performed by: RADIOLOGY

## 2024-11-25 ENCOUNTER — APPOINTMENT (OUTPATIENT)
Dept: OBSTETRICS AND GYNECOLOGY | Facility: CLINIC | Age: 49
End: 2024-11-25
Payer: OTHER GOVERNMENT

## 2024-11-25 VITALS
DIASTOLIC BLOOD PRESSURE: 64 MMHG | HEIGHT: 65 IN | WEIGHT: 164 LBS | BODY MASS INDEX: 27.32 KG/M2 | SYSTOLIC BLOOD PRESSURE: 110 MMHG

## 2024-11-25 DIAGNOSIS — N39.41 URGE INCONTINENCE: ICD-10-CM

## 2024-11-25 DIAGNOSIS — N32.81 OAB (OVERACTIVE BLADDER): ICD-10-CM

## 2024-11-25 DIAGNOSIS — N32.81 OAB (OVERACTIVE BLADDER): Primary | ICD-10-CM

## 2024-11-25 DIAGNOSIS — N95.1 MENOPAUSAL SYMPTOMS: ICD-10-CM

## 2024-11-25 LAB
POC APPEARANCE, URINE: CLEAR
POC BILIRUBIN, URINE: NEGATIVE
POC BLOOD, URINE: NEGATIVE
POC COLOR, URINE: YELLOW
POC GLUCOSE, URINE: NEGATIVE MG/DL
POC KETONES, URINE: ABNORMAL MG/DL
POC LEUKOCYTES, URINE: NEGATIVE
POC NITRITE,URINE: NEGATIVE
POC PH, URINE: 6.5 PH
POC PROTEIN, URINE: NEGATIVE MG/DL
POC SPECIFIC GRAVITY, URINE: 1.02
POC UROBILINOGEN, URINE: 0.2 EU/DL

## 2024-11-25 PROCEDURE — 99203 OFFICE O/P NEW LOW 30 MIN: CPT | Performed by: NURSE PRACTITIONER

## 2024-11-25 PROCEDURE — 3008F BODY MASS INDEX DOCD: CPT | Performed by: NURSE PRACTITIONER

## 2024-11-25 PROCEDURE — 81003 URINALYSIS AUTO W/O SCOPE: CPT | Performed by: NURSE PRACTITIONER

## 2024-11-25 RX ORDER — MIRABEGRON 25 MG/1
25 TABLET, FILM COATED, EXTENDED RELEASE ORAL DAILY
Qty: 30 TABLET | Refills: 1 | Status: SHIPPED | OUTPATIENT
Start: 2024-11-25 | End: 2024-11-26

## 2024-11-25 ASSESSMENT — PAIN SCALES - GENERAL: PAINLEVEL_OUTOF10: 0-NO PAIN

## 2024-11-25 NOTE — PATIENT INSTRUCTIONS
"To reach the Urogynecology nurses for Dr. Panchal and Zhane Burns, call [phone]. You will then select option 2 to be connected to the your provider's office and then option 3 for \"[facility]\". This will connect you with Marie or Geri Thomas.   "

## 2024-11-25 NOTE — LETTER
2024     Lisa Hedrick,   [address]  [facility] [id number]    Patient: Selma Delacruz   YOB: 1975   Date of Visit: 2024       Dear Dr. Lisa Hedrick, :    Thank you for referring Selma Delacruz to me for evaluation. Below are my notes for this consultation.  If you have questions, please do not hesitate to call me. I look forward to following your patient along with you.       Sincerely,     Zhane Burns, APRN-CNP      CC: No Recipients  ______________________________________________________________________________________    Selma Delacruz is a 49 y.o. referred by Dr. Lisa Hedrick for incontinence.     Chief Complaint: UUI     OB/GYN History:   -   - Number of prior c-sections: 2  - Menopausal: S/p hysterectomy   - Sexually active:  Yes  Dyspareunia: No   Other issues: no, denies leaking urine with intercourse   - Ovarian cyst ruptured 5 years ago.   - Denies vulvar irritation.   - Denies hx of abnormal pap.     Prolapse symptoms:   - denies prolapse symptoms   - denies sense of incomplete emptying most of the time, but when her bladder is full she does feel it is harder to empty     Urinary symptoms:   - [facility].   - MONY: Yes   - UUI: Yes, urgency is worse than her MONY  - She leaks urine right after a void upon standing. She also leaks urine 10-15 minutes after a void.   - Occasionally wears pads. Denies wearing pads daily.   - Frequency: Yes   - Nocturia: She has 1-2 nighttime wake ups.   - 0 culture proven UTIs in past year     Bowels  - BMs daily, reports soft and easy to pass    Health Maintenance  - Mammogram up to date: Yes - 11/15/24   - Colonoscopy up to date: Yes - 2/15/24     Past medical and surgical hx reviewed - pertinent for hysterectomy in 2013 for endometriosis and fibroids, tubal ligation, c section x2, denies being a current smoker     Physical Exam  No LMP recorded.  "Patient has had a hysterectomy.  Body mass index is 27.29 kg/m².  /64   Ht 1.651 m (5' 5\")   Wt 74.4 kg (164 lb)   BMI 27.29 kg/m²   General Appearance: well appearing  Neuro: Alert and oriented      Pelvic:  Genitourinary: normal external genitalia, Bartholin's glands negative, [place]'s glands negative, vulvar atrophy   Urethra: normal meatus, non-tender, no periurethral mass  Vaginal mucosa  normal, vaginal atrophy  Cervix surgically absent  Uterus surgically absent  Adnexae  negative nontender, no masses  No significant prolapse    Normal sensation, present reflexes.     Perianal: no hemorrhoids, fissures or masses    PVR (by Ultrasound): 0 ml    Assessment and Plan  49 y.o. female being assessed for REJI with urge > stress.     Diagnoses:   #1 OAB    Plan:   1. OAB  - Discussed first-line intervention methods such as PFPT and lifestyle changes (i.e., limiting fluids to 40-60 oz in total per day and avoiding bladder irritants).  - The second line treatment would be medications.  - Third line options include PTNS once weekly for 12 weeks, Botox and SNM.   - Sent Rx of Myrbetriq 25 mg; take 1 pill by mouth daily. Discussed possible side effects including slightly elevated blood pressure, urinary retention, or postnasal drip. This medication may take up to 2-4 weeks to reach full effect. We will attempt a prior authorization. If this medication is not covered, we will switch to an alternative (Trospium 20 mg BID).      Follow-up in 4-6 weeks for a medication check.     Scribe Attestation:   IChely, am scribing for virtually, and in the presence of Zhane Burns, HENNY-CNP on 11/25/2024 at 7:20 PM.     I, Zhane Burns, personally performed the services described in the documentation as scribed in my presence and confirm it is both complete and accurate.  " Plan: * Biopsy proven SCCis. PARQ- D&C vs treating with 5FU-C cream. Have opted for 5FU-C given he also has numerous AKs on hands which improved with 5FU-C but still remain.\\n- Apply 5FU-C cream BID to dorsal hand as tolerated for up to 4 weeks. Stop sooner if significant irritation occurs. He will wait to start treatment after he is back from trip to [place] in late February. Will f/u at his May [facility] appointment, sooner for worsening or concerns. Written instructions given Detail Level: Zone Plan: * Excellent response to 5FU-C on face, neck, ears, forearms. Still has many hypertrophic AKs on bilateral dorsal hands\\nWill treat AKs on both hands with 5FU-C BID as tolerated for up to 4 weeks. Stop sooner if significant irritation occurs\\nWritten instructions given\\nF/u May apt

## 2024-11-26 RX ORDER — MIRABEGRON 25 MG/1
25 TABLET, FILM COATED, EXTENDED RELEASE ORAL DAILY
Qty: 30 TABLET | Refills: 1 | Status: SHIPPED | OUTPATIENT
Start: 2024-11-26

## 2024-11-26 NOTE — PROGRESS NOTES
"Selma Delacruz is a 49 y.o. referred by Dr. Lisa Hedrick for incontinence.     Chief Complaint: UUI     OB/GYN History:   -   - Number of prior c-sections: 2  - Menopausal: S/p hysterectomy   - Sexually active:  Yes  Dyspareunia: No   Other issues: no, denies leaking urine with intercourse   - Ovarian cyst ruptured 5 years ago.   - Denies vulvar irritation.   - Denies hx of abnormal pap.     Prolapse symptoms:   - denies prolapse symptoms   - denies sense of incomplete emptying most of the time, but when her bladder is full she does feel it is harder to empty     Urinary symptoms:   - [facility] but [facility]'t undergo tx.   - MONY: Yes   - UUI: Yes, urgency is worse than her MONY  - She leaks urine right after a void upon standing. She also leaks urine 10-15 minutes after a void.   - Occasionally wears pads. Denies wearing pads daily.   - Frequency: Yes   - Nocturia: She has 1-2 nighttime wake ups.   - 0 culture proven UTIs in past year     Bowels  - BMs daily, reports soft and easy to pass    Health Maintenance  - Mammogram up to date: Yes - 11/15/24   - Colonoscopy up to date: Yes - 2/15/24     Past medical and surgical hx reviewed - pertinent for hysterectomy in  for endometriosis and fibroids, tubal ligation, c section x2, denies being a current smoker     Physical Exam  No LMP recorded. Patient has had a hysterectomy.  Body mass index is 27.29 kg/m².  /64   Ht 1.651 m (5' 5\")   Wt 74.4 kg (164 lb)   BMI 27.29 kg/m²   General Appearance: well appearing  Neuro: Alert and oriented      Pelvic:  Genitourinary: normal external genitalia, Bartholin's glands negative, [place]'s glands negative, vulvar atrophy   Urethra: normal meatus, non-tender, no periurethral mass  Vaginal mucosa  normal, vaginal atrophy  Cervix surgically absent  Uterus surgically absent  Adnexae  negative nontender, no masses  No significant prolapse    Normal sensation, present reflexes. "     Perianal: no hemorrhoids, fissures or masses    PVR (by Ultrasound): 0 ml    Assessment and Plan  49 y.o. female being assessed for REJI with urge > stress.     Diagnoses:   #1 OAB    Plan:   1. OAB  - Discussed first-line intervention methods such as PFPT and lifestyle changes (i.e., limiting fluids to 40-60 oz in total per day and avoiding bladder irritants).  - The second line treatment would be medications.  - Third line options include PTNS once weekly for 12 weeks, Botox and SNM.   - Sent Rx of Myrbetriq 25 mg; take 1 pill by mouth daily. Discussed possible side effects including slightly elevated blood pressure, urinary retention, or postnasal drip. This medication may take up to 2-4 weeks to reach full effect. We will attempt a prior authorization. If this medication is not covered, we will switch to an alternative (Trospium 20 mg BID).      Follow-up in 4-6 weeks for a medication check.     Scribe Attestation:   IChely, am scribing for virtually, and in the presence of DESHAWN Reynolds on 11/25/2024 at 7:20 PM.     I, Zhane Burns, personally performed the services described in the documentation as scribed in my presence and confirm it is both complete and accurate.

## 2024-12-02 ENCOUNTER — OFFICE VISIT (OUTPATIENT)
Dept: ORTHOPEDIC SURGERY | Facility: CLINIC | Age: 49
End: 2024-12-02
Payer: OTHER GOVERNMENT

## 2024-12-02 DIAGNOSIS — N32.81 OAB (OVERACTIVE BLADDER): ICD-10-CM

## 2024-12-02 DIAGNOSIS — M17.12 PATELLOFEMORAL ARTHRITIS OF LEFT KNEE: Primary | ICD-10-CM

## 2024-12-02 PROCEDURE — 20610 DRAIN/INJ JOINT/BURSA W/O US: CPT | Mod: LT | Performed by: STUDENT IN AN ORGANIZED HEALTH CARE EDUCATION/TRAINING PROGRAM

## 2024-12-02 PROCEDURE — 2500000004 HC RX 250 GENERAL PHARMACY W/ HCPCS (ALT 636 FOR OP/ED): Performed by: STUDENT IN AN ORGANIZED HEALTH CARE EDUCATION/TRAINING PROGRAM

## 2024-12-02 PROCEDURE — 99211 OFF/OP EST MAY X REQ PHY/QHP: CPT | Performed by: STUDENT IN AN ORGANIZED HEALTH CARE EDUCATION/TRAINING PROGRAM

## 2024-12-02 RX ORDER — LIDOCAINE HYDROCHLORIDE 10 MG/ML
4 INJECTION, SOLUTION INFILTRATION; PERINEURAL
Status: COMPLETED | OUTPATIENT
Start: 2024-12-02 | End: 2024-12-02

## 2024-12-02 RX ORDER — TRIAMCINOLONE ACETONIDE 40 MG/ML
40 INJECTION, SUSPENSION INTRA-ARTICULAR; INTRAMUSCULAR
Status: COMPLETED | OUTPATIENT
Start: 2024-12-02 | End: 2024-12-02

## 2024-12-02 NOTE — PROGRESS NOTES
Chief Complaint   Patient presents with    Left Knee - Follow-up     Petellofemoral chondrmalacial mild/ mod. OA  DOS: 9/13/24       History of Present Illness  Selma is a 49-year-old female presenting today for follow-up evaluation after left knee patellofemoral chondroplasty 9/13/2024.  States that patellofemoral chondroplasty did provide her significant relief.  Continues to have some discomfort about her knee 2 out of 10 on the pain scale.  Some days better than others.    Exam  Trace effusion  Well healed incisions   Full range of motion   Negative Brendan´s test  Distal neurovascular exam intact    L Inj/Asp: L knee on 12/2/2024 8:54 AM  Indications: pain  Details: 22 G needle, anterolateral approach  Medications: 40 mg triamcinolone acetonide 40 mg/mL; 4 mL lidocaine 10 mg/mL (1 %)  Consent was given by the patient. Immediately prior to procedure a time out was called to verify the correct patient, procedure, equipment, support staff and site/side marked as required. Patient was prepped and draped in the usual sterile fashion.              Assessment  Patient status post left knee arthroscopy patellofemoral chondroplasty, 3 months out     Plan  Discussed return to activity and home exercise program.  Encouraged home exercise program, physical therapy exercises.    Will proceed with a intra-articular cortisone injection today   Given grade 4 [place] change about the patellofemoral compartment overall cartilage intact about the other areas of her knee may be a candidate for patellofemoral arthroplasty down the road.  Will proceed with injection therapy for her left long as feasible.  In hopes of avoiding knee replacement.  If fails to get benefit from cortisone injection may benefit from gel injection therapy    I discussed risks and benefits of corticosteroid injection and the patient would like to proceed.  Discussed risks of skin depigmentation and the rare but possible intravascular injection of local  anesthetic which can cause palpitations or arrhythmias. I discussed the possibility of a transient increase in blood sugar. I explained that the local anesthetic tends to work immediately, it may take 2-3 days for the full effect of the corticosteroid compound. Consent was obtained and side confirmed by the patient.

## 2024-12-02 NOTE — TELEPHONE ENCOUNTER
Fax received from pharm requesting myrbetriq 25mg need a a PA. PA initiated via epic.     12/4/24 ES sent faxe stating that Myrbetriq PA was denied. DOS notes state:- Sent Rx of Myrbetriq 25 mg; take 1 pill by mouth daily. Discussed possible side effects including slightly elevated blood pressure, urinary retention, or postnasal drip. This medication may take up to 2-4 weeks to reach full effect. We will attempt a prior authorization. If this medication is not covered, we will switch to an alternative (Trospium 20 mg BID).   Trospium Rx sent to provider to sign off on.       12/12/24 Pt sent [email or web address] message on 12/5/24 stating that trospium needs a PA. Reply sent to pt stating that we are Sorry for the delayed response. Somehow the message was routed to an unmonitored box. PA has now been initiated via Epic.

## 2024-12-05 RX ORDER — TROSPIUM CHLORIDE 20 MG/1
20 TABLET, FILM COATED ORAL 2 TIMES DAILY
Qty: 180 TABLET | Refills: 3 | Status: SHIPPED | OUTPATIENT
Start: 2024-12-05 | End: 2025-12-05

## 2024-12-18 ENCOUNTER — APPOINTMENT (OUTPATIENT)
Dept: PRIMARY CARE | Facility: CLINIC | Age: 49
End: 2024-12-18
Payer: OTHER GOVERNMENT

## 2024-12-27 DIAGNOSIS — Z00.00 HEALTHCARE MAINTENANCE: ICD-10-CM

## 2024-12-27 RX ORDER — CETIRIZINE HYDROCHLORIDE 10 MG/1
10 TABLET ORAL DAILY
Qty: 90 TABLET | Refills: 3 | Status: SHIPPED | OUTPATIENT
Start: 2024-12-27

## 2024-12-30 ENCOUNTER — APPOINTMENT (OUTPATIENT)
Dept: OBSTETRICS AND GYNECOLOGY | Facility: CLINIC | Age: 49
End: 2024-12-30
Payer: OTHER GOVERNMENT

## 2024-12-31 ENCOUNTER — TELEPHONE (OUTPATIENT)
Dept: OBSTETRICS AND GYNECOLOGY | Facility: CLINIC | Age: 49
End: 2024-12-31
Payer: OTHER GOVERNMENT

## 2024-12-31 DIAGNOSIS — N32.81 OAB (OVERACTIVE BLADDER): ICD-10-CM

## 2024-12-31 DIAGNOSIS — N39.41 URGE INCONTINENCE: ICD-10-CM

## 2025-01-22 DIAGNOSIS — E66.811 OBESITY (BMI 30.0-34.9): Primary | ICD-10-CM

## 2025-01-23 DIAGNOSIS — F32.A DEPRESSIVE DISORDER: ICD-10-CM

## 2025-01-23 DIAGNOSIS — E66.811 OBESITY (BMI 30.0-34.9): ICD-10-CM

## 2025-01-23 RX ORDER — TIRZEPATIDE 5 MG/.5ML
5 INJECTION, SOLUTION SUBCUTANEOUS
Qty: 6 ML | Refills: 3 | Status: SHIPPED | OUTPATIENT
Start: 2025-01-23 | End: 2026-01-23

## 2025-01-23 RX ORDER — FLUOXETINE HYDROCHLORIDE 20 MG/1
20 CAPSULE ORAL DAILY
Qty: 90 CAPSULE | Refills: 3 | Status: SHIPPED | OUTPATIENT
Start: 2025-01-23 | End: 2026-01-23

## 2025-01-29 ENCOUNTER — APPOINTMENT (OUTPATIENT)
Dept: PHYSICAL THERAPY | Facility: CLINIC | Age: 50
End: 2025-01-29
Payer: OTHER GOVERNMENT

## 2025-02-05 ENCOUNTER — EVALUATION (OUTPATIENT)
Dept: PHYSICAL THERAPY | Facility: CLINIC | Age: 50
End: 2025-02-05
Payer: OTHER GOVERNMENT

## 2025-02-05 DIAGNOSIS — M62.81 MUSCLE WEAKNESS: Primary | ICD-10-CM

## 2025-02-05 DIAGNOSIS — N39.41 URGE INCONTINENCE: ICD-10-CM

## 2025-02-05 DIAGNOSIS — M62.838 MUSCLE SPASM: ICD-10-CM

## 2025-02-05 DIAGNOSIS — N32.81 OAB (OVERACTIVE BLADDER): ICD-10-CM

## 2025-02-05 PROCEDURE — 97161 PT EVAL LOW COMPLEX 20 MIN: CPT | Mod: GP

## 2025-02-05 PROCEDURE — 97112 NEUROMUSCULAR REEDUCATION: CPT | Mod: GP

## 2025-02-05 ASSESSMENT — PAIN - FUNCTIONAL ASSESSMENT: PAIN_FUNCTIONAL_ASSESSMENT: 0-10

## 2025-02-05 ASSESSMENT — PAIN SCALES - GENERAL: PAINLEVEL_OUTOF10: 5 - MODERATE PAIN

## 2025-02-05 ASSESSMENT — PAIN DESCRIPTION - DESCRIPTORS: DESCRIPTORS: ITCHING

## 2025-02-05 NOTE — PROGRESS NOTES
Physical Therapy    Physical Therapy Evaluation    Patient Name: Selma Delacrzu  MRN: [id number]  Today's Date: 2025  Last name and  confirmed verbally by patient.    Time Entry:   Time Calculation  Start Time: 164  Stop Time: 1730  Time Calculation (min): 45 min  PT Evaluation Time Entry  PT Evaluation (Low) Time Entry: 20  PT Therapeutic Procedures Time Entry  Neuromuscular Re-Education Time Entry: 25                    Reason for Visit:  Referred by: GrantZhane  Referral diagnosis:   N39.41 (ICD-10-CM) - Urge incontinence   N32.81 (ICD-10-CM) - OAB (overactive bladder)       Insurance:  Visit: #1  Authorized:  BENEFIT / NO AUTH / $51 COPAY / MED NEC /  $4261 OOP /  HU[employer] SELECT   Payor/Plan: Payor:  / Plan: HUMANA  / Product Type: *No Product type* /     Current Problem  1. Muscle weakness  Follow Up In Physical Therapy      2. Muscle spasm  Follow Up In Physical Therapy      3. Urge incontinence  Referral to Physical Therapy    Follow Up In Physical Therapy      4. OAB (overactive bladder)  Referral to Physical Therapy    Follow Up In Physical Therapy          Assessment   Selma Delacruz presents with chief complaint of urinary urgency, mixed urinary incontinence. Functional limitations include bladder function. Impairments include strength, range of motion, posture. Patient demonstrates decreased range of motion of pelvic floor overall with mild strength deficit, elevated tone, and difficulty lengthening. Patient likely to benefit from skilled physical therapy to return to prior level of function and maximize functional outcome.    Plan  Treatment/Interventions: Biofeedback, Blood flow restriction therapy, Dry needling, Education/ Instruction, Electrical stimulation, Gait training, Manual therapy, Neuromuscular re-education, Self care/ home management, Taping techniques, Therapeutic activities, Therapeutic exercises, Ultrasound  PT Plan: Skilled PT  PT Frequency: 1  time per week  Duration: 12 weeks  Onset Date: 24  Rehab Potential: Good  Plan of Care Agreement: Patient    Next Visit Plan: biofeedback, positioning for bladder emptying, review diaphragmatic breathing/lengthening, check abdominals/adductors, ortho screen, add strengthening      Goals:  Patient will demonstrate independence and report adherence with home exercise program (HEP) to facilitate independent rehab program upon discharge to maximize functional gains.  Patient to report 75% improvement of leaking to demonstrate increased strength of pelvic floor musculature and improved bladder control.  Patient to coordinate thoracic diaphragm with pelvic floor musculature to improve bladder function and decrease leaks.  Patient to report decreased pad use to 0 per day to protect tissue integrity and prevent skin breakdown.  Patient will be able to isolate pelvic floor and contract/relax on cue to improve lumbopelvic stabilization and continence  NIH CPSI will improve by at least 6 points. Baseline: 28    Subjective   Date of Onset: a couple years ago  Chief Complaint: Urinary incontinence, urgency. Doesn't feel like emptying bladder but urodynamics testing says does    2 c-sections    Relevant PMH: anxiety, PTSD  Relevant PSH:  x2, tubal ligation  Red flags: Do you have any of the following? 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  Exercise: pilates 5x a week, lifts weights  Patient stated goals for treatment: strengthen pelvic floor so no urinary incontinence    Bladder Screen:  Daytime voiding: every few hours  Nighttime voidin  Leakage (amount, frequency, protection, activity): wears pad - not soaked, leaking constantly throughout the day, with urgency and post void dribble for most part, maybe with  cough/sneeze/laugh  Urgency: multiple x a day  Triggers: no  Difficulty initiating stream: sometimes if waited too long  Incomplete emptying: no (feels like doesn't)  Post-void dribble: yes  Weak stream: no  Just in case (when): yes  Dysuria: no  Strain to empty bladder: sometimes    Water intake: 96 oz  Other beverages (type, amount): cup of tea    Bowel Screen:  Frequency: regular  Constipation/straining: hemorrhoid right now - not usually has been worse recently  Fecal incontinence (amount, consistency, frequency): no    Sexual health screen:  Currently sexually active: yes  Pain with intercourse: no     Barriers Impacting Care:  None.    Precautions:  Precautions  STEADI Fall Risk Score (The score of 4 or more indicates an increased risk of falling): 0      Pain:  Pain Assessment: 0-10  0-10 (Numeric) Pain Score: 5 - Moderate pain  Pain Location: Rectum  Pain Descriptors: Itching (Swollen)        Objective   Additional Verbalized Informed Consent Explained by Treating Therapist: Yes  Patient Understanding of Examination Techniques:  The patient has been informed about the various examination techniques to be utilized today, including both external and internal methods. A detailed explanation of the purpose and benefits of these techniques has been provided, ensuring the patient has a clear understanding of each aspect.    Consent and Autonomy:  The patient understands that consent for the examination is an ongoing process. They have the right to withdraw consent and terminate the examination at any point should they feel uncomfortable or wish to discontinue for any reason.  The patient acknowledges the importance of communication during the examination and agrees to promptly inform the examiner if they experience any discomfort at any time. This will allow for immediate adjustments to be made to ensure their comfort and safety.    Alternatives to Internal Examination:  The patient is aware of alternative options  to internal examinations, includin. Education and Instruction: Providing information and guidance without performing an internal exam.  2. External Examination: Conducting a pelvic exam while the patient remains clothed or partially clothed.  3. No Examination: The option to forgo the examination entirely if the patient chooses.  The patient has confirmed their understanding of these alternatives and their implications.    Conclusion:  The patient’s consent is based on a thorough understanding of the risks, benefits, and purposes of the examination techniques discussed today. They have been encouraged to ask questions and express any concerns they may have, ensuring an informed and collaborative approach to their care.    Patient consents to internal assessment    Ortho:  Posture:  Seated - forward lean, forward head, rounded shoulders    Gait::  WNL      Pelvic Floor:      Range of motion:  Voluntary contraction: yes  Voluntary relaxation: difficulty at first, improves with practice; decreased range of motion   Involuntary contraction: no  Paradoxical contraction: no    Strength/coordination:  P: 3-/5   E: 4 sec hold   R: 2 reps   F: 6 in 10s    Internal palpation:   No tenderness/hyperactivity    Tonicity:   Mod    Rib cage mobility:  Diaphragmatic breathing fair, chest breathing pattern    Outcome Measures:  Other Measures  Other Outcome Measures: Female NIH-CPSI = 28     TREATMENT  Neuromuscular reeducation:  Educated patient on pelvic floor anatomy and relationship to patient's specific diagnosis  Educated patient on role of physical therapy and plan of care  Educated patient on muscle tension vs weakness vs incoordination  Instructed proper diaphragmatic breathing form  Instructed proper pelvic floor contraction and lengthening  *Urge suppression with quick kegels  *diaphragmatic breathing with pelvic floor lengthening in supine hooklying 3x10 daily  *Happy baby with diaphragmatic breathing 3x30s  daily  *Child's pose with diaphragmatic breathing 3x30s daily    Education:  *HEP: Patient verbalizes and demonstrates understanding of home exercises. Patient will contact PT with questions or if condition worsens.    Access Code: [email or web address]  URL: [email or web address]  Date: 02/05/2025  Prepared by: Sinai Avila    Exercises  - Diaphragmatic Breathing with Pelvic Floor  - 3 x daily - 7 x weekly - 1 sets - 10 reps  - Happy Baby with Pelvic Floor Lengthening  - 3 x daily - 7 x weekly - 1 sets - 30s hold  - Diaphragmatic Breathing in Child's Pose with Pelvic Floor Relaxation  - 3 x daily - 7 x weekly - 1 sets - 30s hold    Sinai Avila, PT, DPT

## 2025-02-06 PROBLEM — M62.81 MUSCLE WEAKNESS: Status: ACTIVE | Noted: 2025-02-06

## 2025-02-06 PROBLEM — N39.41 URGE INCONTINENCE: Status: ACTIVE | Noted: 2025-02-06

## 2025-02-06 PROBLEM — N32.81 OAB (OVERACTIVE BLADDER): Status: ACTIVE | Noted: 2025-02-06

## 2025-02-06 PROBLEM — M62.838 MUSCLE SPASM: Status: ACTIVE | Noted: 2025-02-06

## 2025-02-10 DIAGNOSIS — Z86.19 HISTORY OF COLD SORES: ICD-10-CM

## 2025-02-10 RX ORDER — VALACYCLOVIR HYDROCHLORIDE 1 G/1
TABLET, FILM COATED ORAL
Qty: 20 TABLET | Refills: 0 | Status: SHIPPED | OUTPATIENT
Start: 2025-02-10

## 2025-02-12 ENCOUNTER — APPOINTMENT (OUTPATIENT)
Dept: PHYSICAL THERAPY | Facility: CLINIC | Age: 50
End: 2025-02-12
Payer: OTHER GOVERNMENT

## 2025-02-19 ENCOUNTER — APPOINTMENT (OUTPATIENT)
Dept: PHYSICAL THERAPY | Facility: CLINIC | Age: 50
End: 2025-02-19
Payer: OTHER GOVERNMENT

## 2025-02-26 ENCOUNTER — APPOINTMENT (OUTPATIENT)
Dept: PHYSICAL THERAPY | Facility: CLINIC | Age: 50
End: 2025-02-26
Payer: OTHER GOVERNMENT

## 2025-03-05 ENCOUNTER — APPOINTMENT (OUTPATIENT)
Dept: PHYSICAL THERAPY | Facility: CLINIC | Age: 50
End: 2025-03-05
Payer: OTHER GOVERNMENT

## 2025-03-12 ENCOUNTER — APPOINTMENT (OUTPATIENT)
Dept: PHYSICAL THERAPY | Facility: CLINIC | Age: 50
End: 2025-03-12
Payer: OTHER GOVERNMENT

## 2025-03-17 ENCOUNTER — APPOINTMENT (OUTPATIENT)
Dept: ORTHOPEDIC SURGERY | Facility: CLINIC | Age: 50
End: 2025-03-17
Payer: OTHER GOVERNMENT

## 2025-03-18 ENCOUNTER — HOSPITAL ENCOUNTER (OUTPATIENT)
Dept: RADIOLOGY | Facility: EXTERNAL LOCATION | Age: 50
Discharge: HOME | End: 2025-03-18

## 2025-03-18 ENCOUNTER — OFFICE VISIT (OUTPATIENT)
Dept: ORTHOPEDIC SURGERY | Facility: CLINIC | Age: 50
End: 2025-03-18
Payer: OTHER GOVERNMENT

## 2025-03-18 DIAGNOSIS — M17.12 PATELLOFEMORAL ARTHRITIS OF LEFT KNEE: Primary | ICD-10-CM

## 2025-03-18 PROCEDURE — 1036F TOBACCO NON-USER: CPT | Performed by: INTERNAL MEDICINE

## 2025-03-18 PROCEDURE — 20611 DRAIN/INJ JOINT/BURSA W/US: CPT | Performed by: INTERNAL MEDICINE

## 2025-03-18 PROCEDURE — 99213 OFFICE O/P EST LOW 20 MIN: CPT | Performed by: INTERNAL MEDICINE

## 2025-03-18 RX ORDER — LIDOCAINE HYDROCHLORIDE 10 MG/ML
6 INJECTION, SOLUTION INFILTRATION; PERINEURAL
Status: COMPLETED | OUTPATIENT
Start: 2025-03-18 | End: 2025-03-18

## 2025-03-18 RX ORDER — BETAMETHASONE SODIUM PHOSPHATE AND BETAMETHASONE ACETATE 3; 3 MG/ML; MG/ML
3 INJECTION, SUSPENSION INTRA-ARTICULAR; INTRALESIONAL; INTRAMUSCULAR; SOFT TISSUE
Status: COMPLETED | OUTPATIENT
Start: 2025-03-18 | End: 2025-03-18

## 2025-03-18 RX ADMIN — LIDOCAINE HYDROCHLORIDE 6 ML: 10 INJECTION, SOLUTION INFILTRATION; PERINEURAL at 09:38

## 2025-03-18 RX ADMIN — BETAMETHASONE SODIUM PHOSPHATE AND BETAMETHASONE ACETATE 3 ML: 3; 3 INJECTION, SUSPENSION INTRA-ARTICULAR; INTRALESIONAL; INTRAMUSCULAR; SOFT TISSUE at 09:38

## 2025-03-18 NOTE — PROGRESS NOTES
CC:   No chief complaint on file.      HPI: Selma is a 50 y.o. female presents today to discuss repeat  corticosteroid injection to the left knee status post left knee arthroscopy patellofemoral chondroplasty by Dr. Chandler Aguilar.         Review of Systems   GENERAL: Negative for malaise, significant weight loss, fever  MUSCULOSKELETAL: See HPI  NEURO:  Negative for numbness / tingling     Past Medical History  Past Medical History:   Diagnosis Date    Allergies     Anxiety 2000    Personal history of other endocrine, nutritional and metabolic disease     History of high cholesterol    PTSD (post-traumatic stress disorder) 1998    Vertigo 2013    intermittent, controlled with Zertec       Medication review  Medication Documentation Review Audit       Reviewed by DESHAWN Reynolds (Nurse Practitioner) on 11/25/24 at 0837      Medication Order Taking? Sig Documenting Provider Last Dose Status   ALPRAZolam (Xanax) 0.25 mg tablet [id number]  Take 1 tablet (0.25 mg) by mouth every 8 hours if needed for anxiety. Lisa Hedrick, DO  Active   buPROPion XL (Wellbutrin XL) 300 mg 24 hr tablet [id number]  TAKE 1 TABLET DAILY Lisa Hedrick DO  Active   FLUoxetine (PROzac) 20 mg capsule [id number]  Take 1 capsule (20 mg) by mouth once daily. Lisa Hedrick, DO  Active   multivitamin tablet [id number] No Take 1 tablet by mouth once daily. Historical Provider, MD Past Week Active   naproxen (Naprosyn) 500 mg tablet [id number]  Take 1 tablet (500 mg) by mouth 2 times daily (morning and late afternoon). Sebastian Salcido PA-C  Active   NON FORMULARY [id number] No Elderberry + Vit D + Vit C tabs- Take 2 tabs by mouth daily. Historical Provider, MD 9/12/2024 Active   tirzepatide, weight loss, (Zepbound) 10 mg/0.5 mL injection [id number]  Inject 10 mg under the skin every 7 days. Lisa Hedrick, DO  Active   valACYclovir (Valtrex) 1 gram tablet [id number] No Take 2 tablets by mouth twice daily as needed for cold sores.  Historical Provider, MD Past Month Active                    Allergies  No Known Allergies    Social History  Social History     Socioeconomic History    Marital status:      Spouse name: Not on file    Number of children: Not on file    Years of education: Not on file    Highest education level: Not on file   Occupational History    Not on file   Tobacco Use    Smoking status: Former     Current packs/day: 1.00     Average packs/day: 1 pack/day for 10.0 years (10.0 ttl pk-yrs)     Types: Cigarettes    Smokeless tobacco: Never   Vaping Use    Vaping status: Never Used   Substance and Sexual Activity    Alcohol use: Yes     Alcohol/week: 2.0 standard drinks of alcohol     Types: 2 Standard drinks or equivalent per week    Drug use: Never    Sexual activity: Yes     Partners: Male     Birth control/protection: Female Sterilization   Other Topics Concern    Not on file   Social History Narrative    Not on file     Social Drivers of Health     Financial Resource Strain: Not on file   Food Insecurity: Not on file   Transportation Needs: Not on file   Physical Activity: Not on file   Stress: Not on file   Social Connections: Not on file   Intimate Partner Violence: Not on file   Housing Stability: Not on file       Surgical History  Past Surgical History:   Procedure Laterality Date    BREAST SURGERY       SECTION, LOW TRANSVERSE      COSMETIC SURGERY      HYSTERECTOMY      OTHER SURGICAL HISTORY  2022    Bunionectomy    OTHER SURGICAL HISTORY  2022    Breast augmentation    TUBAL LIGATION         Physical Exam:  GENERAL:  Patient is awake, alert, and oriented to person place and time.  Patient appears well nourished and well kept.  Affect Calm, Not Acutely Distressed.  HEENT:  Normocephalic, Atraumatic, EOMI  CARDIOVASCULAR:  Hemodynamically stable.  RESPIRATORY:  Normal respirations with unlabored breathing.  Extremity: Left knee skin is intact. There is no erythema or warmth. There is no  clinical signs of infection.  Trace amount of effusion.  She can flex left knee 130 degrees.  Full extension at 0 degrees.  Negative valgus and varus stress test.  Negative Lachman's test.  Pain over the medial and lateral patellar facets.  Trace amount of patellar crepitus.  Negative Liliam's test medially.  Negative Liliam's test laterally.  Negative anterior and posterior drawer test.  Negative Lachman's test.      Diagnostics: Previous x-rays and MRI reviewed        Procedure: L Inj/Asp: L knee on 3/18/2025 9:38 AM  Indications: pain  Details: 22 G needle, ultrasound-guided lateral approach  Medications: 3 mL betamethasone acet,sod phos 6 mg/mL; 6 mL lidocaine 10 mg/mL (1 %)  Outcome: tolerated well, no immediate complications  Procedure, treatment alternatives, risks and benefits explained, specific risks discussed. Consent was given by the patient. Immediately prior to procedure a time out was called to verify the correct patient, procedure, equipment, support staff and site/side marked as required. Patient was prepped and draped in the usual sterile fashion.             Assessment:   1.  Status post left knee arthroscopy patellofemoral chondroplasty   2.  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  Risk and benefits of the procedure were discussed, she was agreeable to the injection.  She tolerated injection well.  She will maintain her home PT program.  We did discuss the possibility of future viscosupplementation injections, which she would like to submit for.  She will follow-up upon approval of the gel injections with Dr. Aguilar or myself.    In conclusion, this patient has OA of the knee which is symptomatic causing significant morning stiffness lasting up to an hour with popping,  clicking, grinding with ROM, which is worse with prolonged standing or going up/down stairs.  This affects functional activities and ADLs.  There is radiographic evidence of OA with joint space narrowing and marginal osteophyte formation.  This patient has also failed pharmacologic treatment for OA including OTC analgesics, antiinflammatory medications, as well as intraarticular corticosteroid injections.  For these reasons, I feel the patient is a candidate for viscosupplementation injections of the left knee.       No orders of the defined types were placed in this encounter.     At the conclusion of the visit there were no further questions by the patient/family regarding their plan of care.  Patient was instructed to call or return with any issues, questions, or concerns regarding their injury and/or treatment plan described above.     03/18/25 at 8:56 AM - Emily Orozco MD  Scribe Attestation  By signing my name below, I, Edgardo Nicolasvalerie, Scriblucrecia   attest that this documentation has been prepared under the direction and in the presence of Emily Orozco MD.    Office: [phone]    This note was prepared using voice recognition software.  The details of this note are correct and have been reviewed, and corrected to the best of my ability.  Some grammatical errors may persist related to the Dragon software.

## 2025-04-24 ENCOUNTER — OFFICE VISIT (OUTPATIENT)
Dept: ORTHOPEDIC SURGERY | Facility: CLINIC | Age: 50
End: 2025-04-24
Payer: OTHER GOVERNMENT

## 2025-04-24 ENCOUNTER — HOSPITAL ENCOUNTER (OUTPATIENT)
Dept: RADIOLOGY | Facility: EXTERNAL LOCATION | Age: 50
Discharge: HOME | End: 2025-04-24

## 2025-04-24 DIAGNOSIS — M17.12 PATELLOFEMORAL ARTHRITIS OF LEFT KNEE: Primary | ICD-10-CM

## 2025-04-24 PROCEDURE — 99211 OFF/OP EST MAY X REQ PHY/QHP: CPT | Performed by: INTERNAL MEDICINE

## 2025-04-24 PROCEDURE — 20611 DRAIN/INJ JOINT/BURSA W/US: CPT | Mod: LT | Performed by: INTERNAL MEDICINE

## 2025-04-24 PROCEDURE — 2500000004 HC RX 250 GENERAL PHARMACY W/ HCPCS (ALT 636 FOR OP/ED): Mod: JZ | Performed by: INTERNAL MEDICINE

## 2025-04-24 PROCEDURE — 1036F TOBACCO NON-USER: CPT | Performed by: INTERNAL MEDICINE

## 2025-04-24 RX ADMIN — Medication 2 ML: at 09:58

## 2025-04-24 NOTE — PROGRESS NOTES
CC:   No chief complaint on file.      HPI: Selma is a 50 y.o. female presents today for her first ultrasound-guided Orthovisc injection to the left knee for moderate to severe chondromalacia of the patella.        Review of Systems   GENERAL: Negative for malaise, significant weight loss, fever  MUSCULOSKELETAL: See HPI  NEURO:  Negative for numbness / tingling     Past Medical History  Medical History[1]    Medication review  Medication Documentation Review Audit       Reviewed by Danielle Lopez MA (Medical Assistant) on 25 at 0857      Medication Order Taking? Sig Documenting Provider Last Dose Status   ALPRAZolam (Xanax) 0.25 mg tablet [id number]  Take 1 tablet (0.25 mg) by mouth every 8 hours if needed for anxiety. Lisa Hedrick,    24 235   buPROPion XL (Wellbutrin XL) 300 mg 24 hr tablet [id number]  TAKE 1 TABLET DAILY Lisa Hedrick DO  Active   cetirizine (ZyrTEC) 10 mg tablet [id number]  TAKE 1 TABLET DAILY Lisa Hedrick DO  Active   FLUoxetine (PROzac) 20 mg capsule [id number]  Take 1 capsule (20 mg) by mouth once daily. Lisa Hedrick DO  Active   mirabegron (Mybetriq) 25 mg tablet extended release 24 hr 24 hr tablet [id number]  TAKE 1 TABLET BY MOUTH EVERY DAY HENNY Reynolds-CNP  Active   multivitamin tablet [id number] No Take 1 tablet by mouth once daily. Historical Provider, MD Past Week Active   naproxen (Naprosyn) 500 mg tablet [id number]  Take 1 tablet (500 mg) by mouth 2 times daily (morning and late afternoon). Sebastian Salcido PA-C  Active   NON FORMULARY [id number] No Elderberry + Vit D + Vit C tabs- Take 2 tabs by mouth daily. Historical Provider, MD 2024 Active   tirzepatide, weight loss, (Zepbound) 5 mg/0.5 mL injection [id number]  Inject 5 mg under the skin every 7 days. Lisa Hedrick DO  Active   trospium (Sanctura) 20 mg tablet [id number]  Take 1 tablet (20 mg) by mouth 2 times a day. LONA ReynoldsCNP  Active   valACYclovir  (Valtrex) 1 gram tablet [id number]  Take 2 tablets by mouth twice daily as needed for cold sores. Lisa Hedrick,   Active                    Allergies  RX Allergies[2]    Social History  Social History     Socioeconomic History    Marital status:      Spouse name: Not on file    Number of children: Not on file    Years of education: Not on file    Highest education level: Not on file   Occupational History    Not on file   Tobacco Use    Smoking status: Former     Current packs/day: 1.00     Average packs/day: 1 pack/day for 10.0 years (10.0 ttl pk-yrs)     Types: Cigarettes    Smokeless tobacco: Never   Vaping Use    Vaping status: Never Used   Substance and Sexual Activity    Alcohol use: Yes     Alcohol/week: 2.0 standard drinks of alcohol     Types: 2 Standard drinks or equivalent per week    Drug use: Never    Sexual activity: Yes     Partners: Male     Birth control/protection: Female Sterilization   Other Topics Concern    Not on file   Social History Narrative    Not on file     Social Drivers of Health     Financial Resource Strain: Not on file   Food Insecurity: Not on file   Transportation Needs: Not on file   Physical Activity: Not on file   Stress: Not on file   Social Connections: Not on file   Intimate Partner Violence: Not on file   Housing Stability: Not on file       Surgical History  Surgical History[3]    Physical Exam:  GENERAL:  Patient is awake, alert, and oriented to person place and time.  Patient appears well nourished and well kept.  Affect Calm, Not Acutely Distressed.  HEENT:  Normocephalic, Atraumatic, EOMI  CARDIOVASCULAR:  Hemodynamically stable.  RESPIRATORY:  Normal respirations with unlabored breathing.  Extremity: Left knee skin is intact. There is no erythema or warmth. There is no clinical signs of infection.       Diagnostics: None today  Point of Care Ultrasound  These images are not reportable by radiology and will not be interpreted   by   Radiologists.        Procedure: L Inj/Asp: L knee on 4/24/2025 9:58 AM  Indications: pain  Details: 22 G needle, ultrasound-guided lateral approach  Medications: 2 mL hyaluronan 30 mg/2 mL  Outcome: tolerated well, no immediate complications  Procedure, treatment alternatives, risks and benefits explained, specific risks discussed. Consent was given by the patient. Immediately prior to procedure a time out was called to verify the correct patient, procedure, equipment, support staff and site/side marked as required. Patient was prepped and draped in the usual sterile fashion.             Assessment:   1.  Status post left knee arthroscopy patellofemoral chondroplasty   2.  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 She tolerated her first injection today. She will follow-up next week for the second injection.     No orders of the defined types were placed in this encounter.     At the conclusion of the visit there were no further questions by the patient/family regarding their plan of care.  Patient was instructed to call or return with any issues, questions, or concerns regarding their injury and/or treatment plan described above.     04/24/25 at 9:23 AM - Emily Orozco MD  Scribe Attestation  By signing my name below, I, Edgardo Fidenciodemo, Scriblucrecia   attest that this documentation has been prepared under the direction and in the presence of Emily Orozco MD.    Office: [phone]    This note was prepared using voice recognition software.  The details of this note are correct and have been reviewed, and corrected to the best of my ability.  Some grammatical errors may persist related to the Dragon software.         [1]   Past Medical History:  Diagnosis Date    Allergies     Anxiety 2000    Personal history of other endocrine, nutritional  and metabolic disease     History of high cholesterol    PTSD (post-traumatic stress disorder)     Vertigo     intermittent, controlled with Zertec   [2] No Known Allergies  [3]   Past Surgical History:  Procedure Laterality Date    BREAST SURGERY       SECTION, LOW TRANSVERSE      COSMETIC SURGERY      HYSTERECTOMY      OTHER SURGICAL HISTORY  2022    Bunionectomy    OTHER SURGICAL HISTORY  2022    Breast augmentation    TUBAL LIGATION

## 2025-04-28 DIAGNOSIS — F32.A DEPRESSIVE DISORDER: ICD-10-CM

## 2025-04-28 RX ORDER — FLUOXETINE HYDROCHLORIDE 20 MG/1
20 CAPSULE ORAL DAILY
Qty: 90 CAPSULE | Refills: 3 | Status: SHIPPED | OUTPATIENT
Start: 2025-04-28

## 2025-04-30 ENCOUNTER — APPOINTMENT (OUTPATIENT)
Dept: PRIMARY CARE | Facility: CLINIC | Age: 50
End: 2025-04-30
Payer: OTHER GOVERNMENT

## 2025-04-30 VITALS
HEART RATE: 80 BPM | DIASTOLIC BLOOD PRESSURE: 70 MMHG | OXYGEN SATURATION: 100 % | WEIGHT: 147 LBS | SYSTOLIC BLOOD PRESSURE: 100 MMHG | BODY MASS INDEX: 24.49 KG/M2 | TEMPERATURE: 97.9 F | HEIGHT: 65 IN | RESPIRATION RATE: 16 BRPM

## 2025-04-30 DIAGNOSIS — F41.9 ANXIETY: Primary | ICD-10-CM

## 2025-04-30 DIAGNOSIS — F43.10 POSTTRAUMATIC STRESS DISORDER: ICD-10-CM

## 2025-04-30 DIAGNOSIS — Z23 ENCOUNTER FOR IMMUNIZATION: ICD-10-CM

## 2025-04-30 DIAGNOSIS — R63.4 WEIGHT LOSS DUE TO MEDICATION: ICD-10-CM

## 2025-04-30 DIAGNOSIS — Z00.00 HEALTH CARE MAINTENANCE: ICD-10-CM

## 2025-04-30 DIAGNOSIS — T50.905A WEIGHT LOSS DUE TO MEDICATION: ICD-10-CM

## 2025-04-30 PROBLEM — M62.838 MUSCLE SPASM: Status: RESOLVED | Noted: 2025-02-06 | Resolved: 2025-04-30

## 2025-04-30 PROBLEM — M62.81 MUSCLE WEAKNESS: Status: RESOLVED | Noted: 2025-02-06 | Resolved: 2025-04-30

## 2025-04-30 PROCEDURE — 90471 IMMUNIZATION ADMIN: CPT | Performed by: INTERNAL MEDICINE

## 2025-04-30 PROCEDURE — 90750 HZV VACC RECOMBINANT IM: CPT | Performed by: INTERNAL MEDICINE

## 2025-04-30 PROCEDURE — 3008F BODY MASS INDEX DOCD: CPT | Performed by: INTERNAL MEDICINE

## 2025-04-30 PROCEDURE — 99214 OFFICE O/P EST MOD 30 MIN: CPT | Performed by: INTERNAL MEDICINE

## 2025-04-30 NOTE — PROGRESS NOTES
Subjective   Selma Delacruz is a 50 y.o. female who presents for 6 month Follow-up.    HPI     OARRS:  Lisa Hedrick,  on 4/30/2025  8:10 AM  I have personally reviewed the OARRS report for Selma Delacruz. I have considered the risks of abuse, dependence, addiction and diversion    Is the patient prescribed a combination of a benzodiazepine and opioid?  No    Last Urine Drug Screen / ordered today: No  Recent Results (from the past 8760 hours)   Confirmation Opiate/Opioid/Benzo Prescription Compliance    Collection Time: 11/04/24  8:47 AM   Result Value Ref Range    Clonazepam <25 <25 ng/mL    7-Aminoclonazepam <25 <25 ng/mL    Alprazolam <25 <25 ng/mL    Alpha-Hydroxyalprazolam <25 <25 ng/mL    Midazolam <25 <25 ng/mL    Alpha-Hydroxymidazolam <25 <25 ng/mL    Chlordiazepoxide <25 <25 ng/mL    Diazepam <25 <25 ng/mL    Nordiazepam <25 <25 ng/mL    Temazepam <25 <25 ng/mL    Oxazepam <25 <25 ng/mL    Lorazepam <25 <25 ng/mL    Methadone <25 <25 ng/mL    EDDP <25 <25 ng/mL    6-Acetylmorphine <25 <25 ng/mL    Codeine <50 <50 ng/mL    Hydrocodone <25 <25 ng/mL    Hydromorphone <25 <25 ng/mL    Morphine  <50 <50 ng/mL    Norhydrocodone <25 <25 ng/mL    Noroxycodone <25 <25 ng/mL    Oxycodone <25 <25 ng/mL    Oxymorphone <25 <25 ng/mL    Fentanyl <2.5 <2.5 ng/mL    Norfentanyl <2.5 <2.5 ng/mL    Tramadol <50 <50 ng/mL    O-Desmethyltramadol <50 <50 ng/mL    Zolpidem <25 <25 ng/mL    Zolpidem Metabolite (ZCA) <25 <25 ng/mL   Screen Opiate/Opioid/Benzo Prescription Compliance    Collection Time: 11/04/24  8:47 AM   Result Value Ref Range    Creatinine, Urine Random 153.2 20.0 - 320.0 mg/dL    Amphetamine Screen, Urine Presumptive Negative Presumptive Negative    Barbiturate Screen, Urine Presumptive Negative Presumptive Negative    Cannabinoid Screen, Urine Presumptive Negative Presumptive Negative    Cocaine Metabolite Screen, Urine Presumptive Negative Presumptive Negative    PCP Screen, Urine Presumptive  "Negative Presumptive Negative     Results are as expected.         Controlled Substance Agreement:  Date of the Last Agreement: 11/2024  Reviewed Controlled Substance Agreement including but not limited to the benefits, risks, and alternatives to treatment with a Controlled Substance medication(s).    Benzodiazepines:  What is the patient's goal of therapy? Anxiety relief  Is this being achieved with current treatment? Yes    SALVADOR-7:  No data recorded    Activities of Daily Living:   Is your overall impression that this patient is benefiting (symptom reduction outweighs side effects) from benzodiazepine therapy? Yes     1. Physical Functioning: Better  2. Family Relationship: Better  3. Social Relationship: Better  4. Mood: Better  5. Sleep Patterns: Better  6. Overall Function: Better  Review of Systems    Health Maintenance Due   Topic Date Due    HIV Screening  Never done    MMR Vaccines (1 of 1 - Standard series) Never done    Hepatitis C Screening  Never done    Diabetes Screening  Never done    Hepatitis B Vaccines (1 of 3 - 19+ 3-dose series) Never done    DTaP/Tdap/Td Vaccines (1 - Tdap) 05/04/2019    Pneumococcal Vaccine (1 of 1 - PCV) Never done    COVID-19 Vaccine (3 - 2024-25 season) 09/01/2024       Objective   /70   Pulse 80   Temp 36.6 °C (97.9 °F)   Resp 16   Ht 1.651 m (5' 5\")   Wt 66.7 kg (147 lb)   SpO2 100%   BMI 24.46 kg/m²     Physical Exam    Assessment/Plan   Problem List Items Addressed This Visit       Anxiety - Primary    Posttraumatic stress disorder     Other Visit Diagnoses         Encounter for immunization        Relevant Orders    Zoster vaccine, recombinant, adult (SHINGRIX) (Completed)      Weight loss due to medication          Health care maintenance        Relevant Orders    CBC    Comprehensive Metabolic Panel    Lipid Panel    Thyroid Stimulating Hormone    Vitamin B12    Vitamin D 25-Hydroxy,Total (for eval of Vitamin D levels)          I have personally reviewed " patients OARRS report.  This report is scanned into the emr.  I have considered the risks of abuse, dependence, addiction and diversion.  Cont meds  Work on diet and activity  Check labs before cpe

## 2025-05-01 ENCOUNTER — OFFICE VISIT (OUTPATIENT)
Dept: ORTHOPEDIC SURGERY | Facility: CLINIC | Age: 50
End: 2025-05-01
Payer: OTHER GOVERNMENT

## 2025-05-01 ENCOUNTER — HOSPITAL ENCOUNTER (OUTPATIENT)
Dept: RADIOLOGY | Facility: EXTERNAL LOCATION | Age: 50
Discharge: HOME | End: 2025-05-01

## 2025-05-01 DIAGNOSIS — M17.12 PATELLOFEMORAL ARTHRITIS OF LEFT KNEE: ICD-10-CM

## 2025-05-01 PROCEDURE — 20611 DRAIN/INJ JOINT/BURSA W/US: CPT | Mod: LT | Performed by: INTERNAL MEDICINE

## 2025-05-01 PROCEDURE — 1036F TOBACCO NON-USER: CPT | Performed by: INTERNAL MEDICINE

## 2025-05-01 PROCEDURE — 2500000004 HC RX 250 GENERAL PHARMACY W/ HCPCS (ALT 636 FOR OP/ED): Mod: JZ | Performed by: INTERNAL MEDICINE

## 2025-05-01 RX ADMIN — Medication 2 ML: at 11:41

## 2025-05-01 NOTE — PROGRESS NOTES
CC:   Chief Complaint   Patient presents with    Left Knee - Injections     Orthovisc #2       HPI: Selma is a 50 y.o. female presents today for her second ultrasound-guided Orthovisc injection to the left knee for moderate to severe chondromalacia of the patella.         Review of Systems   GENERAL: Negative for malaise, significant weight loss, fever  MUSCULOSKELETAL: See HPI  NEURO:  Negative for numbness / tingling     Past Medical History  Medical History[1]    Medication review  Medication Documentation Review Audit       Reviewed by Danielle Lopez MA (Medical Assistant) on 25 at 0925      Medication Order Taking? Sig Documenting Provider Last Dose Status   ALPRAZolam (Xanax) 0.25 mg tablet [id number]  Take 1 tablet (0.25 mg) by mouth every 8 hours if needed for anxiety. Lisa Hedrick DO   25 2359   buPROPion XL (Wellbutrin XL) 300 mg 24 hr tablet [id number]  TAKE 1 TABLET DAILY Lisa Hedrick DO  Active   cetirizine (ZyrTEC) 10 mg tablet [id number]  TAKE 1 TABLET DAILY Lisa Hedrick DO  Active     Discontinued 25 0801   FLUoxetine (PROzac) 20 mg capsule [id number]  TAKE 1 CAPSULE BY MOUTH EVERY DAY Lisa Hedrick DO  Active   mirabegron (Mybetriq) 25 mg tablet extended release 24 hr 24 hr tablet [id number]  TAKE 1 TABLET BY MOUTH EVERY DAY   Patient not taking: Reported on 2025    Zhane Bursn, APRN-CNP  Active   multivitamin tablet [id number] No Take 1 tablet by mouth once daily. Historical Provider, MD Past Week Active   naproxen (Naprosyn) 500 mg tablet [id number]  Take 1 tablet (500 mg) by mouth 2 times daily (morning and late afternoon). Sebastian Salcido PA-C  Active   NON FORMULARY [id number] No Elderberry + Vit D + Vit C tabs- Take 2 tabs by mouth daily. Historical Provider, MD 2024 Active   tirzepatide, weight loss, (Zepbound) 5 mg/0.5 mL injection [id number]  Inject 5 mg under the skin every 7 days. Lisa Hedrick DO  Active     Discontinued 25  0804   valACYclovir (Valtrex) 1 gram tablet [id number]  Take 2 tablets by mouth twice daily as needed for cold sores. Lisa Hedrick,   Active                    Allergies  RX Allergies[2]    Social History  Social History     Socioeconomic History    Marital status:      Spouse name: Not on file    Number of children: Not on file    Years of education: Not on file    Highest education level: Not on file   Occupational History    Not on file   Tobacco Use    Smoking status: Former     Current packs/day: 1.00     Average packs/day: 1 pack/day for 10.0 years (10.0 ttl pk-yrs)     Types: Cigarettes    Smokeless tobacco: Never   Vaping Use    Vaping status: Never Used   Substance and Sexual Activity    Alcohol use: Yes     Alcohol/week: 2.0 standard drinks of alcohol     Types: 2 Standard drinks or equivalent per week    Drug use: Never    Sexual activity: Yes     Partners: Male     Birth control/protection: Female Sterilization   Other Topics Concern    Not on file   Social History Narrative    Not on file     Social Drivers of Health     Financial Resource Strain: Not on file   Food Insecurity: Not on file   Transportation Needs: Not on file   Physical Activity: Not on file   Stress: Not on file   Social Connections: Not on file   Intimate Partner Violence: Not on file   Housing Stability: Not on file       Surgical History  Surgical History[3]    Physical Exam:  GENERAL:  Patient is awake, alert, and oriented to person place and time.  Patient appears well nourished and well kept.  Affect Calm, Not Acutely Distressed.  HEENT:  Normocephalic, Atraumatic, EOMI  CARDIOVASCULAR:  Hemodynamically stable.  RESPIRATORY:  Normal respirations with unlabored breathing.  Extremity:  Left knee skin is intact. There is no erythema or warmth. There is no clinical signs of infection.       Diagnostics: None today  Point of Care Ultrasound  These images are not reportable by radiology and will not be interpreted   by   Radiologists.        Procedure: L Inj/Asp: L knee on 5/1/2025 11:41 AM  Indications: pain  Details: 22 G needle, ultrasound-guided lateral approach  Medications: 2 mL hyaluronan 30 mg/2 mL  Outcome: tolerated well, no immediate complications  Procedure, treatment alternatives, risks and benefits explained, specific risks discussed. Consent was given by the patient. Immediately prior to procedure a time out was called to verify the correct patient, procedure, equipment, support staff and site/side marked as required. Patient was prepped and draped in the usual sterile fashion.             Assessment:   1.  Status post left knee arthroscopy patellofemoral chondroplasty   2.  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 She tolerated her second injection today. She will follow-up next week for the third injection.     Orders Placed This Encounter    Point of Care Ultrasound      At the conclusion of the visit there were no further questions by the patient/family regarding their plan of care.  Patient was instructed to call or return with any issues, questions, or concerns regarding their injury and/or treatment plan described above.     05/01/25 at 9:30 AM - Emily Orozco MD  Scribe Attestation  By signing my name below, I, Edgardo Colemanmo, Scribe   attest that this documentation has been prepared under the direction and in the presence of Emily Orozco MD.    Office: [phone]    This note was prepared using voice recognition software.  The details of this note are correct and have been reviewed, and corrected to the best of my ability.  Some grammatical errors may persist related to the Dragon software.         [1]   Past Medical History:  Diagnosis Date    Allergies     Anxiety 2000    Personal history of other endocrine, nutritional and  metabolic disease     History of high cholesterol    PTSD (post-traumatic stress disorder)     Vertigo     intermittent, controlled with Zertec   [2] No Known Allergies  [3]   Past Surgical History:  Procedure Laterality Date    BREAST SURGERY       SECTION, LOW TRANSVERSE      COSMETIC SURGERY      HYSTERECTOMY      OTHER SURGICAL HISTORY  2022    Bunionectomy    OTHER SURGICAL HISTORY  2022    Breast augmentation    TUBAL LIGATION

## 2025-05-05 DIAGNOSIS — Z00.00 HEALTH CARE MAINTENANCE: Primary | ICD-10-CM

## 2025-05-08 ENCOUNTER — HOSPITAL ENCOUNTER (OUTPATIENT)
Dept: RADIOLOGY | Facility: EXTERNAL LOCATION | Age: 50
Discharge: HOME | End: 2025-05-08

## 2025-05-08 ENCOUNTER — OFFICE VISIT (OUTPATIENT)
Dept: ORTHOPEDIC SURGERY | Facility: CLINIC | Age: 50
End: 2025-05-08
Payer: OTHER GOVERNMENT

## 2025-05-08 DIAGNOSIS — M17.12 PATELLOFEMORAL ARTHRITIS OF LEFT KNEE: Primary | ICD-10-CM

## 2025-05-08 PROCEDURE — 2500000004 HC RX 250 GENERAL PHARMACY W/ HCPCS (ALT 636 FOR OP/ED): Mod: JZ | Performed by: INTERNAL MEDICINE

## 2025-05-08 PROCEDURE — 20611 DRAIN/INJ JOINT/BURSA W/US: CPT | Mod: LT | Performed by: INTERNAL MEDICINE

## 2025-05-08 RX ADMIN — Medication 2 ML: at 10:17

## 2025-05-08 NOTE — PROGRESS NOTES
CC:   Chief Complaint   Patient presents with    Left Knee - Injections     Orthovisc #3       HPI: Selma is a 50 y.o. 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         Review of Systems   GENERAL: Negative for malaise, significant weight loss, fever  MUSCULOSKELETAL: See HPI  NEURO:  Negative for numbness / tingling     Past Medical History  Medical History[1]    Medication review  Medication Documentation Review Audit       Reviewed by Danielle Lopez MA (Medical Assistant) on 25 at 0925      Medication Order Taking? Sig Documenting Provider Last Dose Status   ALPRAZolam (Xanax) 0.25 mg tablet [id number]  Take 1 tablet (0.25 mg) by mouth every 8 hours if needed for anxiety. Lisa Hedrick DO   25 2359   buPROPion XL (Wellbutrin XL) 300 mg 24 hr tablet [id number]  TAKE 1 TABLET DAILY Lisa Hedrick DO  Active   cetirizine (ZyrTEC) 10 mg tablet [id number]  TAKE 1 TABLET DAILY Lisa Hedrick DO  Active     Discontinued 25 0801   FLUoxetine (PROzac) 20 mg capsule [id number]  TAKE 1 CAPSULE BY MOUTH EVERY DAY Lisa Hedrick DO  Active   mirabegron (Mybetriq) 25 mg tablet extended release 24 hr 24 hr tablet [id number]  TAKE 1 TABLET BY MOUTH EVERY DAY   Patient not taking: Reported on 2025    Zhane Burns, APRN-CNP  Active   multivitamin tablet [id number] No Take 1 tablet by mouth once daily. Historical Provider, MD Past Week Active   naproxen (Naprosyn) 500 mg tablet [id number]  Take 1 tablet (500 mg) by mouth 2 times daily (morning and late afternoon). Sebastian Salcido PA-C  Active   NON FORMULARY [id number] No Elderberry + Vit D + Vit C tabs- Take 2 tabs by mouth daily. Historical Provider, MD 2024 Active   tirzepatide, weight loss, (Zepbound) 5 mg/0.5 mL injection [id number]  Inject 5 mg under the skin every 7 days. Lisa Hedrick DO  Active      Discontinued 04/30/25 0804   valACYclovir (Valtrex) 1 gram tablet [id number]  Take 2 tablets by mouth twice daily as needed for cold sores. Lisa Hedrick,   Active                    Allergies  RX Allergies[2]    Social History  Social History     Socioeconomic History    Marital status:      Spouse name: Not on file    Number of children: Not on file    Years of education: Not on file    Highest education level: Not on file   Occupational History    Not on file   Tobacco Use    Smoking status: Former     Current packs/day: 1.00     Average packs/day: 1 pack/day for 10.0 years (10.0 ttl pk-yrs)     Types: Cigarettes    Smokeless tobacco: Never   Vaping Use    Vaping status: Never Used   Substance and Sexual Activity    Alcohol use: Yes     Alcohol/week: 2.0 standard drinks of alcohol     Types: 2 Standard drinks or equivalent per week    Drug use: Never    Sexual activity: Yes     Partners: Male     Birth control/protection: Female Sterilization   Other Topics Concern    Not on file   Social History Narrative    Not on file     Social Drivers of Health     Financial Resource Strain: Not on file   Food Insecurity: Not on file   Transportation Needs: Not on file   Physical Activity: Not on file   Stress: Not on file   Social Connections: Not on file   Intimate Partner Violence: Not on file   Housing Stability: Not on file       Surgical History  Surgical History[3]    Physical Exam:  GENERAL:  Patient is awake, alert, and oriented to person place and time.  Patient appears well nourished and well kept.  Affect Calm, Not Acutely Distressed.  HEENT:  Normocephalic, Atraumatic, EOMI  CARDIOVASCULAR:  Hemodynamically stable.  RESPIRATORY:  Normal respirations with unlabored breathing.  Extremity: Left knee skin is intact. There is no erythema or warmth. There is no clinical signs of infection.       Diagnostics: None today  Point of Care Ultrasound  These images are not reportable by radiology and will not be  interpreted   by  Radiologists.        Procedure: L Inj/Asp: L knee on 5/8/2025 10:17 AM  Indications: pain  Details: 22 G needle, ultrasound-guided lateral approach  Medications: 2 mL hyaluronan 30 mg/2 mL  Outcome: tolerated well, no immediate complications  Procedure, treatment alternatives, risks and benefits explained, specific risks discussed. Consent was given by the patient. Immediately prior to procedure a time out was called to verify the correct patient, procedure, equipment, support staff and site/side marked as required. Patient was prepped and draped in the usual sterile fashion.             Assessment:   1.  Status post left knee arthroscopy patellofemoral chondroplasty   2.  Left knee moderate to severe chondromalacia of the patella    Plan: Selma presents today for her third ultrasound-guided Orthovisc injection to the left knee for moderate to severe chondromalacia of the patella. She tolerated her third injection today. We discussed that she could repeat the injections anytime after 6 months.     Orders Placed This Encounter    Point of Care Ultrasound      At the conclusion of the visit there were no further questions by the patient/family regarding their plan of care.  Patient was instructed to call or return with any issues, questions, or concerns regarding their injury and/or treatment plan described above.     05/08/25 at 9:32 AM - Emily Orozco MD  Scribe Attestation  By signing my name below, I, Edgardo Fidenciodemo, Scriblucrecia   attest that this documentation has been prepared under the direction and in the presence of Emily Orozco MD.    Office: [phone]    This note was prepared using voice recognition software.  The details of this note are correct and have been reviewed, and corrected to the best of my ability.  Some grammatical errors may persist related to the Dragon software.         [1]   Past Medical History:  Diagnosis Date    Allergies     Anxiety 2000    Personal  history of other endocrine, nutritional and metabolic disease     History of high cholesterol    PTSD (post-traumatic stress disorder)     Vertigo     intermittent, controlled with Zertec   [2] No Known Allergies  [3]   Past Surgical History:  Procedure Laterality Date    BREAST SURGERY       SECTION, LOW TRANSVERSE      COSMETIC SURGERY      HYSTERECTOMY      OTHER SURGICAL HISTORY  2022    Bunionectomy    OTHER SURGICAL HISTORY  2022    Breast augmentation    TUBAL LIGATION

## 2025-05-21 LAB
HBV SURFACE AB SERPL IA-ACNC: <5 MIU/ML
IGNF NEG CNTRL BLD: NORMAL
M TB IFN-G BLD-IMP: NEGATIVE
MEV IGG SER IA-ACNC: 26.2 AU/ML
MITOGEN IGNF.SPOT COUNT BLD: NORMAL
MUV IGG SER IA-ACNC: 33.7 AU/ML
QUEST PANEL A SPOT COUNT: 0
QUEST PANEL B SPOT COUNT: 0
RUBV IGG SERPL IA-ACNC: 5.16 INDEX
VZV IGG SER IA-ACNC: 41.1 S/CO

## 2025-06-06 ENCOUNTER — CLINICAL SUPPORT (OUTPATIENT)
Dept: PRIMARY CARE | Facility: CLINIC | Age: 50
End: 2025-06-06
Payer: OTHER GOVERNMENT

## 2025-06-06 DIAGNOSIS — Z23 ENCOUNTER FOR IMMUNIZATION: ICD-10-CM

## 2025-06-06 PROCEDURE — 90715 TDAP VACCINE 7 YRS/> IM: CPT | Performed by: INTERNAL MEDICINE

## 2025-06-06 PROCEDURE — 90739 HEPB VACC 2/4 DOSE ADULT IM: CPT | Performed by: INTERNAL MEDICINE

## 2025-06-06 PROCEDURE — 90472 IMMUNIZATION ADMIN EACH ADD: CPT | Performed by: INTERNAL MEDICINE

## 2025-06-06 PROCEDURE — 90471 IMMUNIZATION ADMIN: CPT | Performed by: INTERNAL MEDICINE

## 2025-06-06 NOTE — PROGRESS NOTES
NV for Tdap and Hep B.  Tdap administered to L deltoid.  Tolerated well.  Hep B administered to R deltoid. Tolerated well.

## 2025-07-07 ENCOUNTER — APPOINTMENT (OUTPATIENT)
Dept: PRIMARY CARE | Facility: CLINIC | Age: 50
End: 2025-07-07
Payer: OTHER GOVERNMENT

## 2025-07-07 DIAGNOSIS — Z23 ENCOUNTER FOR IMMUNIZATION: ICD-10-CM

## 2025-07-07 PROCEDURE — 90471 IMMUNIZATION ADMIN: CPT | Performed by: INTERNAL MEDICINE

## 2025-07-07 PROCEDURE — 90739 HEPB VACC 2/4 DOSE ADULT IM: CPT | Performed by: INTERNAL MEDICINE

## 2025-08-26 DIAGNOSIS — L98.9 SKIN LESION: Primary | ICD-10-CM

## 2025-11-05 ENCOUNTER — APPOINTMENT (OUTPATIENT)
Dept: PRIMARY CARE | Facility: CLINIC | Age: 50
End: 2025-11-05
Payer: OTHER GOVERNMENT

## (undated) DEVICE — TUBING, PUMP MAIN 16FT STERILE

## (undated) DEVICE — BANDAGE, COMPRESSION, W/CLIP, FLEX-MASTER, DOUBLE LENGTH, 6 IN X 11 YD, LF

## (undated) DEVICE — GLOVE, SURGICAL, PROTEXIS PI , 7.5, PF, LF

## (undated) DEVICE — SOLUTION, IRRIGATION, SODIUM CHLORIDE 0.9%, 1000 ML, POUR BOTTLE

## (undated) DEVICE — APPLICATOR, CHLORAPREP, W/ORANGE TINT, 26ML

## (undated) DEVICE — BLADE, STRYKER, 4.0MM, ANGLED, AGGRESSIVE PLUS

## (undated) DEVICE — DRESSING, GAUZE, SUPER KERLIX, 6X6

## (undated) DEVICE — PADDING, WEBRIL, UNDERCAST, STERILE, 6 IN

## (undated) DEVICE — SOLUTION, IRRIGATION, STERILE WATER, 1000 ML, POUR BOTTLE

## (undated) DEVICE — Device

## (undated) DEVICE — DRESSING, GAUZE, PETROLATUM, PATCH, XEROFORM, 1 X 8 IN, STERILE

## (undated) DEVICE — GLOVE, SURGICAL, PROTEXIS PI W/NEU-THERA, 8.0, PF, LF

## (undated) DEVICE — DRESSING, ABDOMINAL, WET PRUF, TENDERSORB, 5 X 9 IN, STERILE

## (undated) DEVICE — STRIP, SKIN CLOSURE, STERI STRIP, REINFORCED, 0.5 X 4 IN

## (undated) DEVICE — BANDAGE, ESMARK, 6 IN X 12 FT

## (undated) DEVICE — NEEDLE, HYPODERMIC, SPECIALTY, REGULAR WALL, SHORT BEVEL, 18 G X 1.5 IN

## (undated) DEVICE — BLADE, STRYKER, 4.0MM, AGG PLUS SHVBLD ULTMT

## (undated) DEVICE — SUTURE, MONOCRYL, 3-0, 27 IN, PS-2, UNDYED

## (undated) DEVICE — BANDAGE, COFLEX, 6 X 5 YDS, FOAM TAN, STERILE, LF

## (undated) DEVICE — SYRINGE, 35 CC, LUER LOCK, MONOJECT, W/O CAP, LF

## (undated) DEVICE — TOWEL PACK, STERILE, 4/PACK, BLUE